# Patient Record
Sex: MALE | URBAN - METROPOLITAN AREA
[De-identification: names, ages, dates, MRNs, and addresses within clinical notes are randomized per-mention and may not be internally consistent; named-entity substitution may affect disease eponyms.]

---

## 2017-01-03 ENCOUNTER — OFFICE VISIT (OUTPATIENT)
Dept: FAMILY MEDICINE | Facility: CLINIC | Age: 62
End: 2017-01-03
Payer: COMMERCIAL

## 2017-01-03 VITALS
TEMPERATURE: 95.8 F | DIASTOLIC BLOOD PRESSURE: 76 MMHG | HEART RATE: 63 BPM | SYSTOLIC BLOOD PRESSURE: 134 MMHG | WEIGHT: 255.2 LBS | OXYGEN SATURATION: 95 % | HEIGHT: 70 IN | BODY MASS INDEX: 36.54 KG/M2

## 2017-01-03 DIAGNOSIS — J30.2 SEASONAL ALLERGIC RHINITIS, UNSPECIFIED ALLERGIC RHINITIS TRIGGER: Primary | ICD-10-CM

## 2017-01-03 DIAGNOSIS — Z71.89 ADVANCED DIRECTIVES, COUNSELING/DISCUSSION: ICD-10-CM

## 2017-01-03 DIAGNOSIS — K21.00 GASTROESOPHAGEAL REFLUX DISEASE WITH ESOPHAGITIS: ICD-10-CM

## 2017-01-03 PROCEDURE — 99214 OFFICE O/P EST MOD 30 MIN: CPT | Performed by: NURSE PRACTITIONER

## 2017-01-03 RX ORDER — CETIRIZINE HYDROCHLORIDE 10 MG/1
10 TABLET ORAL EVERY EVENING
Qty: 90 TABLET | Refills: 1 | Status: SHIPPED | OUTPATIENT
Start: 2017-01-03 | End: 2017-04-11

## 2017-01-03 NOTE — MR AVS SNAPSHOT
After Visit Summary   1/3/2017    Russell Miller    MRN: [id number]           Patient Information     Date Of Birth          1955        Visit Information        Provider Department      1/3/2017 10:00 AM Francine Maddox NP [facility]        Today's Diagnoses     Advanced directives, counseling/discussion    -  1     Seasonal allergic rhinitis, unspecified allergic rhinitis trigger         Gastroesophageal reflux disease with esophagitis           Care Instructions    Please take these medications daily, they need to be taken daily for full effectiveness. Let me know if they are helping or not. There are other options as we discussed. Follow up if your symptoms persist or worsen.          Allergic Rhinitis  Allergic rhinitis is an allergic reaction that affects the nose, and often the eyes. It s often known as nasal allergies. Nasal allergies are often due to things in the environment that are breathed in. Depending what you are sensitive to, nasal allergies may occur only during certain seasons. Or they may occur year round. Common indoor allergens include house dust mites, mold, cockroaches, and pet dander. Outdoor allergens include pollen from trees, grasses, and weeds.   Symptoms include a drippy, stuffy, and itchy nose. They also include sneezing and red and itchy eyes. You may feel tired more often. Severe allergies may also affect your breathing and trigger a condition called asthma.   Tests can be done to see what allergens are affecting you. You may be referred to an allergy specialist for testing and further evaluation.  Home care  The healthcare provider may prescribe medicines to help relieve allergy symptoms.   Ask the provider for advice on how to avoid substances that you are allergic to. Below are a few tips for each type of allergen.  Pet dander:    Do not have pets with fur and feathers.    If you cannot avoid having a pet, keep it out of your bedroom and off upholstered  furniture.  Pollen:    When pollen counts are high, keep windows of your car and home closed. If possible, use an air conditioner instead.    Wear a filter mask when mowing or doing yard work.  House dust mites:    Wash bedding every week in warm water and detergent and dry on a hot setting.    Cover the mattress, box spring, and pillows with allergy covers.     If possible, sleep in a room with no carpet, curtains, or upholstered furniture.  Cockroaches:    Store food in sealed containers.    Remove garbage from the home promptly.    Fix water leaks  Mold:    Keep humidity low by using a dehumidifier or air conditioner. Keep the dehumidifier and air conditioner clean and free of mold.    Clean moldy areas with bleach and water.  In general:    Vacuum once or twice a week. If possible, use a vacuum with a high-efficiency particulate air (HEPA) filter.    Do not smoke. Avoid cigarette smoke. Cigarette smoke is an irritant that can make symptoms worse.  Follow-up care  Follow up as advised by the health care provider or our staff. If you were referred to an allergy specialist, make this appointment promptly.  When to seek medical advice  Call your healthcare provider right away if the following occur:    Coughing or wheezing    Fever greater than 100.4 F (38 C)    Continuing symptoms, new symptoms, or worsening symptoms  Call 911 right away if you have:    Trouble breathing    Hives (raised red bumps)    Severe swelling of the face or severe itching of the eyes or mouth    1139-6487 [employer]. [address] [place] [address], [place] [address]. All rights reserved. This information is not intended as a substitute for professional medical care. Always follow your healthcare professional's instructions.        Lifestyle Changes for Controlling GERD  When you have GERD, stomach acid feels as if it s backing up toward your mouth. Whether or not you take medication to control your GERD, your symptoms can often be  improved with lifestyle changes. Talk to your doctor about the following suggestions, which may help you get relief from your symptoms.  Raise Your Head    Reflux is more likely to strike when you re lying down flat, because stomach fluid can flow backward more easily. Raising the head of your bed 4 to 6 inches can help. To do this:    Slide blocks or books under the legs at the head of your bed. Or, place a wedge under the mattress. Many foam stores can make a suitable wedge for you. The wedge should run from your waist to the top of your head.    Don t just prop your head on several pillows. This increases pressure on your stomach. It can make GERD worse.  Watch Your Eating Habits  Certain foods may increase the acid in your stomach or relax the lower esophageal sphincter, making GERD more likely. It s best to avoid the following:    Coffee, tea, and carbonated drinks (with and without caffeine)    Fatty, fried, or spicy food    Mint, chocolate, onions, and tomatoes    Any other foods that seem to irritate your stomach or cause you pain  Relieve the Pressure    Eat smaller meals, even if you have to eat more often.    Don t lie down right after you eat. Wait a few hours for your stomach to empty.    Avoid tight belts and tight-fitting clothes.    Lose excess weight.  Tobacco and Alcohol  Avoid smoking tobacco and drinking alcohol. They can make GERD symptoms worse.    7299-5340 [employer]. [address] [place] [address]. All rights reserved. This information is not intended as a substitute for professional medical care. Always follow your healthcare professional's instructions.              Follow-ups after your visit        Who to contact     Normal or non-critical lab and imaging results will be communicated to you by MyChart, letter or phone within 4 business days after the clinic has received the results. If you do not hear from us within 7 days, please contact the clinic through  "[employer] or phone. If you have a critical or abnormal lab result, we will notify you by phone as soon as possible.  Submit refill requests through [employer] or call your pharmacy and they will forward the refill request to us. Please allow 3 business days for your refill to be completed.          If you need to speak with a  for additional information , please call: [phone]             Additional Information About Your Visit        [employer] Information     [employer] lets you send messages to your doctor, view your test results, renew your prescriptions, schedule appointments and more. To sign up, go to [email or web address] . Click on \"Log in\" on the left side of the screen, which will take you to the Welcome page. Then click on \"Sign up Now\" on the right side of the page.     You will be asked to enter the access code listed below, as well as some personal information. Please follow the directions to create your username and password.     Your access code is: [email or web address]  Expires: 3/7/2017  1:00 PM     Your access code will  in 90 days. If you need help or a new code, please call your [facility] clinic or [phone].        Care EveryWhere ID     This is your Care EveryWhere ID. This could be used by other organizations to access your [facility] medical records  [id number]        Your Vitals Were     Pulse Temperature Height BMI (Body Mass Index) Pulse Oximetry       63 95.8  F (35.4  C) (Oral) 5' 10.28\" (1.785 m) 36.33 kg/m2 95%        Blood Pressure from Last 3 Encounters:   17 153/96   16 116/80   16 125/83    Weight from Last 3 Encounters:   17 255 lb 3.2 oz (115.758 kg)   16 256 lb (116.121 kg)   16 244 lb (110.678 kg)              Today, you had the following     No orders found for display         Today's Medication Changes          These changes are accurate as of: 1/3/17 10:13 AM.  If you have any questions, ask your nurse or doctor.             "   Start taking these medicines.        Dose/Directions    cetirizine 10 MG tablet   Commonly known as:  zyrTEC   Used for:  Seasonal allergic rhinitis, unspecified allergic rhinitis trigger   Started by:  Francine Maddox NP        Dose:  10 mg   Take 1 tablet (10 mg) by mouth every evening   Quantity:  90 tablet   Refills:  1         These medicines have changed or have updated prescriptions.        Dose/Directions    * omeprazole 20 MG CR capsule   Commonly known as:  priLOSEC   This may have changed:  Another medication with the same name was added. Make sure you understand how and when to take each.   Used for:  Gastroesophageal reflux disease without esophagitis   Changed by:  Sharad Maya PA-C        Dose:  20 mg   Take 1 capsule (20 mg) by mouth daily   Quantity:  90 capsule   Refills:  3       * omeprazole 20 MG CR capsule   Commonly known as:  priLOSEC   This may have changed:  You were already taking a medication with the same name, and this prescription was added. Make sure you understand how and when to take each.   Used for:  Gastroesophageal reflux disease with esophagitis   Changed by:  Francine Maddox NP        Dose:  20 mg   Take 1 capsule (20 mg) by mouth daily   Quantity:  90 capsule   Refills:  1       * Notice:  This list has 2 medication(s) that are the same as other medications prescribed for you. Read the directions carefully, and ask your doctor or other care provider to review them with you.      Stop taking these medicines if you haven't already. Please contact your care team if you have questions.     sildenafil 100 MG cap/tab   Commonly known as:  REVATIO/VIAGRA   Stopped by:  Francine Maddox NP           sildenafil 20 MG tablet   Commonly known as:  REVATIO/VIAGRA   Stopped by:  Francine Maddox NP                Where to get your medicines      These medications were sent to [facility] - [address] [facility]  [address] [facility] [place]  [address]     Phone:  [phone]    - cetirizine 10 MG tablet  - omeprazole 20 MG CR capsule             Primary Care Provider Office Phone # Fax #    Sharad Maya PA-C [phone]       [facility] [address]  [facility] [address]        Thank you!     Thank you for choosing [facility]  for your care. Our goal is always to provide you with excellent care. Hearing back from our patients is one way we can continue to improve our services. Please take a few minutes to complete the written survey that you may receive in the mail after your visit with us. Thank you!             Your Updated Medication List - Protect others around you: Learn how to safely use, store and throw away your medicines at www.disposemymeds.org.          This list is accurate as of: 1/3/17 10:13 AM.  Always use your most recent med list.                   Brand Name Dispense Instructions for use    albuterol 108 (90 BASE) MCG/ACT Inhaler    PROAIR HFA/PROVENTIL HFA/VENTOLIN HFA    1 Inhaler    Inhale 2 puffs into the lungs every 6 hours as needed for shortness of breath / dyspnea or wheezing       aspirin 81 MG tablet     30 tablet    Take 1 tablet (81 mg) by mouth daily       buPROPion 150 MG 24 hr tablet    WELLBUTRIN XL    60 tablet    Take 1 tab daily for 10 days, then increase to 2 tabs daily thereafter       carvedilol 12.5 MG tablet    COREG    180 tablet    Take 1 tablet (12.5 mg) by mouth 2 times daily (with meals) Taking once daily       cetirizine 10 MG tablet    zyrTEC    90 tablet    Take 1 tablet (10 mg) by mouth every evening       lisinopril 2.5 MG tablet    PRINIVIL/Zestril    30 tablet    Take 1 tablet (2.5 mg) by mouth daily       nitroglycerin 0.4 MG sublingual tablet    NITROSTAT    25 tablet    Place 1 tablet (0.4 mg) under the tongue every 5 minutes as needed for chest pain If still having symptoms after 3 doses call 911.       OLANZapine 5 MG tablet    zyPREXA    30 tablet    Take  1 tablet (5 mg) orally at bedtime       * omeprazole 20 MG CR capsule    priLOSEC    90 capsule    Take 1 capsule (20 mg) by mouth daily       * omeprazole 20 MG CR capsule    priLOSEC    90 capsule    Take 1 capsule (20 mg) by mouth daily       zolpidem 10 MG tablet    AMBIEN    30 tablet    Take 0.5-1 tablets (5-10 mg) by mouth nightly as needed for sleep       * Notice:  This list has 2 medication(s) that are the same as other medications prescribed for you. Read the directions carefully, and ask your doctor or other care provider to review them with you.

## 2017-01-03 NOTE — PATIENT INSTRUCTIONS
Please take these medications daily, they need to be taken daily for full effectiveness. Let me know if they are helping or not. There are other options as we discussed. Follow up if your symptoms persist or worsen.          Allergic Rhinitis  Allergic rhinitis is an allergic reaction that affects the nose, and often the eyes. It s often known as nasal allergies. Nasal allergies are often due to things in the environment that are breathed in. Depending what you are sensitive to, nasal allergies may occur only during certain seasons. Or they may occur year round. Common indoor allergens include house dust mites, mold, cockroaches, and pet dander. Outdoor allergens include pollen from trees, grasses, and weeds.   Symptoms include a drippy, stuffy, and itchy nose. They also include sneezing and red and itchy eyes. You may feel tired more often. Severe allergies may also affect your breathing and trigger a condition called asthma.   Tests can be done to see what allergens are affecting you. You may be referred to an allergy specialist for testing and further evaluation.  Home care  The healthcare provider may prescribe medicines to help relieve allergy symptoms.   Ask the provider for advice on how to avoid substances that you are allergic to. Below are a few tips for each type of allergen.  Pet dander:    Do not have pets with fur and feathers.    If you cannot avoid having a pet, keep it out of your bedroom and off upholstered furniture.  Pollen:    When pollen counts are high, keep windows of your car and home closed. If possible, use an air conditioner instead.    Wear a filter mask when mowing or doing yard work.  House dust mites:    Wash bedding every week in warm water and detergent and dry on a hot setting.    Cover the mattress, box spring, and pillows with allergy covers.     If possible, sleep in a room with no carpet, curtains, or upholstered furniture.  Cockroaches:    Store food in sealed  containers.    Remove garbage from the home promptly.    Fix water leaks  Mold:    Keep humidity low by using a dehumidifier or air conditioner. Keep the dehumidifier and air conditioner clean and free of mold.    Clean moldy areas with bleach and water.  In general:    Vacuum once or twice a week. If possible, use a vacuum with a high-efficiency particulate air (HEPA) filter.    Do not smoke. Avoid cigarette smoke. Cigarette smoke is an irritant that can make symptoms worse.  Follow-up care  Follow up as advised by the health care provider or our staff. If you were referred to an allergy specialist, make this appointment promptly.  When to seek medical advice  Call your healthcare provider right away if the following occur:    Coughing or wheezing    Fever greater than 100.4 F (38 C)    Continuing symptoms, new symptoms, or worsening symptoms  Call 911 right away if you have:    Trouble breathing    Hives (raised red bumps)    Severe swelling of the face or severe itching of the eyes or mouth    5140-1304 [employer]. [address] [place] [address]. All rights reserved. This information is not intended as a substitute for professional medical care. Always follow your healthcare professional's instructions.        Lifestyle Changes for Controlling GERD  When you have GERD, stomach acid feels as if it s backing up toward your mouth. Whether or not you take medication to control your GERD, your symptoms can often be improved with lifestyle changes. Talk to your doctor about the following suggestions, which may help you get relief from your symptoms.  Raise Your Head    Reflux is more likely to strike when you re lying down flat, because stomach fluid can flow backward more easily. Raising the head of your bed 4 to 6 inches can help. To do this:    Slide blocks or books under the legs at the head of your bed. Or, place a wedge under the mattress. Many [employer] stores can make a suitable wedge for  you. The wedge should run from your waist to the top of your head.    Don t just prop your head on several pillows. This increases pressure on your stomach. It can make GERD worse.  Watch Your Eating Habits  Certain foods may increase the acid in your stomach or relax the lower esophageal sphincter, making GERD more likely. It s best to avoid the following:    Coffee, tea, and carbonated drinks (with and without caffeine)    Fatty, fried, or spicy food    Mint, chocolate, onions, and tomatoes    Any other foods that seem to irritate your stomach or cause you pain  Relieve the Pressure    Eat smaller meals, even if you have to eat more often.    Don t lie down right after you eat. Wait a few hours for your stomach to empty.    Avoid tight belts and tight-fitting clothes.    Lose excess weight.  Tobacco and Alcohol  Avoid smoking tobacco and drinking alcohol. They can make GERD symptoms worse.    5194-0369 [employer]. [address] [place] [address], [place] [address]. All rights reserved. This information is not intended as a substitute for professional medical care. Always follow your healthcare professional's instructions.

## 2017-01-03 NOTE — PROGRESS NOTES
"  SUBJECTIVE:  Russell Miller is a 62 year old male who presents with the following concerns;              Symptoms: cc Present Absent Comment   Fever/Chills       Fatigue       Muscle Aches       Eye Irritation  x     Sneezing  x     Nasal Freddie/Drg  x     Sinus Pressure/Pain       Loss of smell  x     Dental pain       Sore Throat       Swollen Glands       Ear Pain/Fullness       Cough       Wheeze       Chest Pain       Shortness of breath       Rash       Other  x  allergies     Symptom duration:  ongoing for 35-40 years   Sympom severity:  mod   Treatments tried:  tylenol severe allergy and cold   Contacts:  none       Medications updated and reviewed.  Past, family and surgical history is updated and reviewed in the record.  Patient Active Problem List    Diagnosis Date Noted     Advanced directives, counseling/discussion 01/03/2017     Priority: Medium     Advance Care Planning 1/3/2017: ACP Review of Chart / Resources Provided:  Reviewed chart for advance care plan.  Russell Miller has been provided information and resources to begin or update their advance care plan.  Added by Jacquelyn Sy            MARGOT (generalized anxiety disorder) 09/01/2016     Priority: Medium     Moderate single current episode of major depressive disorder (H) 09/01/2016     Priority: Medium     Essential hypertension 04/22/2016     Priority: Medium     Psychophysiological insomnia 04/22/2016     Priority: Medium     Other male erectile dysfunction 04/22/2016     Priority: Medium     Gastroesophageal reflux disease without esophagitis 01/07/2016     Priority: Medium     Past Medical History   Diagnosis Date     MI (myocardial infarction) (H)      PUD (peptic ulcer disease)       Family History   Problem Relation Age of Onset     Heart Failure Mother      Hypertension Mother      CEREBROVASCULAR DISEASE Mother      \"numerous\"     Heart Failure Father      Hypertension Father      Myocardial Infarction Father 53     CABG x 2     DIABETES No " family hx of      Hyperlipidemia No family hx of      Other Cancer No family hx of      Prostate Cancer No family hx of      Colon Cancer No family hx of      ROS:  GENERAL: No change in weight, sleep or appetite.  Normal energy.  No fever or chills  EYES: Negative for vision changes or eye problems  ENT: No problems with ears, nose or throat.  No difficulty swallowing. Ongoing daily clear runny nose, itchy eyes, sneezing  RESP: No coughing, wheezing or shortness of breath  CV: No chest pains or palpitations  GI: No nausea, vomiting, abdominal pain, diarrhea, constipation or change in bowel habits POSITIVE for heart burn with sore throat and choking sensation at times.   : No urinary frequency or dysuria, bladder or kidney problems  MUSCULOSKELETAL: No significant muscle or joint pains  NEUROLOGIC: No headaches, numbness, tingling, weakness, problems with balance or coordination  PSYCHIATRIC: No problems with anxiety, depression or mental health  HEME/IMMUNE/ALLERGY: No history of bleeding or clotting problems or anemia.  No allergies or immune system problems  ENDOCRINE: No history of thyroid disease, diabetes or other endocrine disorders  SKIN: No rashes,worrisome lesions or skin problems    OBJECTIVE:  GENERAL:  Alert, no acute distress  EYES:  PERRL, EOM normal, conjunctiva and lids normal  HEENT:  Ears and TMs normal, oral mucosa and posterior oropharynx normal POSITIVE for nasal mucosa pale, bluish, clear nasal drainage, PND  RESP:  Lungs clear to auscultation.  CV:  Normal rate, regular rhythm, no murmur or gallop.  LYMPHATICS:  No cervical, supraclavicular adenopathy  NEURO:  Alert, oriented, speech and mentation normal    Assessment/Plan:     ICD-10-CM    1. Seasonal allergic rhinitis, unspecified allergic rhinitis trigger J30.2 cetirizine (ZYRTEC) 10 MG tablet   2. Advanced directives, counseling/discussion Z71.89    3. Gastroesophageal reflux disease with esophagitis K21.0 omeprazole (PRILOSEC) 20 MG CR  capsule        See patient instructions, discussed that cold medication can raise blood pressure. Please take these medications daily, they need to be taken daily for full effectiveness. Let me know if they are helping or not. There are other options as we discussed. Follow up if your symptoms persist or worsen.     NILSON Browne, FNP-BC

## 2017-01-03 NOTE — NURSING NOTE
"Chief Complaint   Patient presents with     Ent Problem       Initial /96 mmHg  Pulse 63  Temp(Src) 95.8  F (35.4  C) (Oral)  Ht 5' 10.28\" (1.785 m)  Wt 255 lb 3.2 oz (115.758 kg)  BMI 36.33 kg/m2  SpO2 95% Estimated body mass index is 36.33 kg/(m^2) as calculated from the following:    Height as of this encounter: 5' 10.28\" (1.785 m).    Weight as of this encounter: 255 lb 3.2 oz (115.758 kg)..  BP completed using cuff size: nadeen Sy MA   "

## 2017-02-28 DIAGNOSIS — F32.1 MODERATE SINGLE CURRENT EPISODE OF MAJOR DEPRESSIVE DISORDER (H): ICD-10-CM

## 2017-02-28 DIAGNOSIS — F51.04 PSYCHOPHYSIOLOGICAL INSOMNIA: ICD-10-CM

## 2017-02-28 NOTE — TELEPHONE ENCOUNTER
bupropion       Last Written Prescription Date: 01/23/17  Last Fill Quantity: 60; # refills: 1  Last Office Visit with [facility], P or [facility] prescribing provider:  01/03/17        Last PHQ-9 score on record=   PHQ-9 SCORE 9/26/2016   Total Score 18       Lab Results   Component Value Date    AST 33 09/01/2016     Lab Results   Component Value Date    ALT 36 09/01/2016         Zolpidem      Last Written Prescription Date:  01/23/17  Last Fill Quantity: 30,   # refills: 0  Last Office Visit with [facility], P or [facility] prescribing provider: 01/03/17  Future Office visit:       Routing refill request to provider for review/approval because:  Drug not on the [facility], P or [facility] refill protocol or controlled substance

## 2017-03-01 RX ORDER — BUPROPION HYDROCHLORIDE 150 MG/1
TABLET ORAL
Qty: 60 TABLET | Refills: 0 | Status: SHIPPED | OUTPATIENT
Start: 2017-03-01 | End: 2017-04-04

## 2017-03-01 RX ORDER — ZOLPIDEM TARTRATE 10 MG/1
5-10 TABLET ORAL
Qty: 30 TABLET | Refills: 0 | Status: SHIPPED | OUTPATIENT
Start: 2017-03-01 | End: 2017-03-27

## 2017-03-01 NOTE — TELEPHONE ENCOUNTER
LVM for patient to return call to clinic. Patient is due for medication follow up. Please schedule.       Roxie WangCMA

## 2017-03-27 DIAGNOSIS — F51.04 PSYCHOPHYSIOLOGICAL INSOMNIA: ICD-10-CM

## 2017-03-27 DIAGNOSIS — R45.86 VARIABLE MOOD: ICD-10-CM

## 2017-03-27 RX ORDER — OLANZAPINE 5 MG/1
TABLET ORAL
Qty: 30 TABLET | Refills: 0 | Status: SHIPPED | OUTPATIENT
Start: 2017-03-27 | End: 2017-04-04

## 2017-03-27 RX ORDER — ZOLPIDEM TARTRATE 10 MG/1
5-10 TABLET ORAL
Qty: 30 TABLET | Refills: 0 | Status: SHIPPED | OUTPATIENT
Start: 2017-03-27 | End: 2017-04-04

## 2017-03-27 NOTE — TELEPHONE ENCOUNTER
ZOLPIDEM      Last Written Prescription Date:  3-1-17  Last Fill Quantity: 30,   # refills: 0  [place] [facility] [employer] [email or web address] prescribing provider: 1-3-17  Future Office visit:       Routing refill request to provider for review/approval because:  Drug not on the [facility] or [employer] [facility] refill protocol or controlled substance    OLANZAPINE     Last Written Prescription Date: 2-27-17  Last Fill Quantity: 30, # refills: 1  Last [place] [facility] or [email or web address] prescribing provider: 1-3-17       BP Readings from Last 3 Encounters:   01/03/17 134/76   12/07/16 116/80   09/22/16 125/83     Pulse Readings from Last 2 Encounters:   01/03/17 63   12/07/16 74     Lab Results   Component Value Date     09/01/2016     Lab Results   Component Value Date    WBC 7.1 02/18/2016     Lab Results   Component Value Date    RBC 5.00 02/18/2016     Lab Results   Component Value Date    HGB 16.0 02/18/2016     Lab Results   Component Value Date    HCT 46.9 02/18/2016     No components found for: MCT  Lab Results   Component Value Date    MCV 94 02/18/2016     Lab Results   Component Value Date    MCH 32.0 02/18/2016     Lab Results   Component Value Date    MCHC 34.1 02/18/2016     Lab Results   Component Value Date    RDW 13.0 02/18/2016     Lab Results   Component Value Date     02/18/2016     No results found for: CHOL  No results found for: HDL  No results found for: LDL  No results found for: TRIG  No results found for: CHOLHDLRATIO

## 2017-03-28 NOTE — TELEPHONE ENCOUNTER
Hard copy of script faxed to [facility] pharmacy. Left message on voicemail for patient to return call

## 2017-04-04 ENCOUNTER — OFFICE VISIT (OUTPATIENT)
Dept: FAMILY MEDICINE | Facility: CLINIC | Age: 62
End: 2017-04-04
Payer: COMMERCIAL

## 2017-04-04 VITALS
SYSTOLIC BLOOD PRESSURE: 156 MMHG | BODY MASS INDEX: 36.36 KG/M2 | HEIGHT: 70 IN | WEIGHT: 254 LBS | HEART RATE: 74 BPM | DIASTOLIC BLOOD PRESSURE: 98 MMHG

## 2017-04-04 DIAGNOSIS — I10 ESSENTIAL HYPERTENSION: ICD-10-CM

## 2017-04-04 DIAGNOSIS — E66.01 MORBID OBESITY, UNSPECIFIED OBESITY TYPE (H): ICD-10-CM

## 2017-04-04 DIAGNOSIS — F41.1 GAD (GENERALIZED ANXIETY DISORDER): Primary | ICD-10-CM

## 2017-04-04 DIAGNOSIS — R45.86 VARIABLE MOOD: ICD-10-CM

## 2017-04-04 DIAGNOSIS — F32.1 MODERATE SINGLE CURRENT EPISODE OF MAJOR DEPRESSIVE DISORDER (H): ICD-10-CM

## 2017-04-04 DIAGNOSIS — I42.8 NONISCHEMIC CARDIOMYOPATHY (H): ICD-10-CM

## 2017-04-04 DIAGNOSIS — F51.04 PSYCHOPHYSIOLOGICAL INSOMNIA: ICD-10-CM

## 2017-04-04 PROCEDURE — 36415 COLL VENOUS BLD VENIPUNCTURE: CPT | Performed by: PHYSICIAN ASSISTANT

## 2017-04-04 PROCEDURE — 99214 OFFICE O/P EST MOD 30 MIN: CPT | Performed by: PHYSICIAN ASSISTANT

## 2017-04-04 PROCEDURE — 80048 BASIC METABOLIC PNL TOTAL CA: CPT | Performed by: PHYSICIAN ASSISTANT

## 2017-04-04 RX ORDER — LISINOPRIL/HYDROCHLOROTHIAZIDE 10-12.5 MG
1 TABLET ORAL DAILY
Qty: 30 TABLET | Refills: 1 | Status: SHIPPED | OUTPATIENT
Start: 2017-04-04 | End: 2017-06-05

## 2017-04-04 RX ORDER — LISINOPRIL 2.5 MG/1
2.5 TABLET ORAL DAILY
Qty: 90 TABLET | Refills: 1 | Status: CANCELLED | OUTPATIENT
Start: 2017-04-04

## 2017-04-04 RX ORDER — ZOLPIDEM TARTRATE 10 MG/1
5-10 TABLET ORAL
Qty: 30 TABLET | Refills: 0 | Status: CANCELLED | OUTPATIENT
Start: 2017-04-04

## 2017-04-04 RX ORDER — OLANZAPINE 10 MG/1
10 TABLET ORAL AT BEDTIME
Qty: 90 TABLET | Refills: 1 | Status: SHIPPED | OUTPATIENT
Start: 2017-04-04 | End: 2017-04-11

## 2017-04-04 RX ORDER — BUPROPION HYDROCHLORIDE 300 MG/1
300 TABLET ORAL EVERY MORNING
Qty: 90 TABLET | Refills: 1 | Status: SHIPPED | OUTPATIENT
Start: 2017-04-04 | End: 2017-06-13

## 2017-04-04 RX ORDER — CARVEDILOL 12.5 MG/1
12.5 TABLET ORAL 2 TIMES DAILY WITH MEALS
Qty: 180 TABLET | Refills: 1 | Status: SHIPPED | OUTPATIENT
Start: 2017-04-04 | End: 2017-06-05

## 2017-04-04 NOTE — MR AVS SNAPSHOT
After Visit Summary   4/4/2017    Russell Miller    MRN: [id number]           Patient Information     Date Of Birth          1955        Visit Information        Provider Department      4/4/2017 4:20 PM Sharad Maya PA-C [facility]        Today's Diagnoses     MARGOT (generalized anxiety disorder)    -  1    Psychophysiological insomnia        Moderate single current episode of major depressive disorder (H)        Nonischemic cardiomyopathy (H)        Essential hypertension        Variable mood (H)           Follow-ups after your visit        Additional Services     MENTAL HEALTH REFERRAL       Your provider has referred you to: [facility]: [facility] - [facility]  [phone]   [email or web address]   *Referral from [facility] Primary Care Provider required - Consultation Model - medication management & future refills will be returned to [facility] PCP upon completion of evaluation  *Please call to schedule an appointment.    All scheduling is subject to the client's specific insurance plan & benefits, provider/location availability, and provider clinical specialities.  Please arrive 15 minutes early for your first appointment and bring your completed paperwork.    Please be aware that coverage of these services is subject to the terms and limitations of your health insurance plan.  Call member services at your health plan with any benefit or coverage questions.                  Who to contact     Normal or non-critical lab and imaging results will be communicated to you by MyChart, letter or phone within 4 business days after the clinic has received the results. If you do not hear from us within 7 days, please contact the clinic through MyChart or phone. If you have a critical or abnormal lab result, we will notify you by phone as soon as possible.  Submit refill requests through MyChart or call your  "pharmacy and they will forward the refill request to us. Please allow 3 business days for your refill to be completed.          If you need to speak with a  for additional information , please call: [phone]             Additional Information About Your Visit        [employer] Information     [employer] lets you send messages to your doctor, view your test results, renew your prescriptions, schedule appointments and more. To sign up, go to [email or web address] . Click on \"Log in\" on the left side of the screen, which will take you to the Welcome page. Then click on \"Sign up Now\" on the right side of the page.     You will be asked to enter the access code listed below, as well as some personal information. Please follow the directions to create your username and password.     Your access code is: [email or web address]  Expires: 7/3/2017  5:00 PM     Your access code will  in 90 days. If you need help or a new code, please call your [place] clinic or [phone].        Care EveryWhere ID     This is your Care EveryWhere ID. This could be used by other organizations to access your [place] medical records  [id number]        Your Vitals Were     Pulse Height BMI (Body Mass Index)             74 5' 10\" (1.778 m) 36.45 kg/m2          Blood Pressure from Last 3 Encounters:   17 (!) 156/98   17 134/76   16 116/80    Weight from Last 3 Encounters:   17 254 lb (115.2 kg)   17 255 lb 3.2 oz (115.8 kg)   16 256 lb (116.1 kg)              We Performed the Following     Basic metabolic panel  (Ca, Cl, CO2, Creat, Gluc, K, Na, BUN)     MENTAL HEALTH REFERRAL          Today's Medication Changes          These changes are accurate as of: 17  5:01 PM.  If you have any questions, ask your nurse or doctor.               Start taking these medicines.        Dose/Directions    lisinopril-hydrochlorothiazide 10-12.5 MG per tablet   Commonly known as:  PRINZIDE/ZESTORETIC   Used " for:  Essential hypertension   Started by:  Sharad Maya PA-C        Dose:  1 tablet   Take 1 tablet by mouth daily   Quantity:  30 tablet   Refills:  1         These medicines have changed or have updated prescriptions.        Dose/Directions    buPROPion 300 MG 24 hr tablet   Commonly known as:  WELLBUTRIN XL   This may have changed:    - medication strength  - how much to take  - how to take this  - when to take this  - additional instructions   Used for:  Moderate single current episode of major depressive disorder (H)   Changed by:  Sharad Maya PA-C        Dose:  300 mg   Take 1 tablet (300 mg) by mouth every morning   Quantity:  90 tablet   Refills:  1       OLANZapine 10 MG tablet   Commonly known as:  zyPREXA   This may have changed:    - medication strength  - how much to take  - how to take this  - when to take this  - additional instructions   Used for:  Variable mood (H), Psychophysiological insomnia   Changed by:  Sharad Maya PA-C        Dose:  10 mg   Take 1 tablet (10 mg) by mouth At Bedtime   Quantity:  90 tablet   Refills:  1       omeprazole 20 MG CR capsule   Commonly known as:  priLOSEC   This may have changed:  Another medication with the same name was removed. Continue taking this medication, and follow the directions you see here.   Used for:  Gastroesophageal reflux disease with esophagitis   Changed by:  Francine Maddox NP        Dose:  20 mg   Take 1 capsule (20 mg) by mouth daily   Quantity:  90 capsule   Refills:  1         Stop taking these medicines if you haven't already. Please contact your care team if you have questions.     albuterol 108 (90 BASE) MCG/ACT Inhaler   Commonly known as:  PROAIR HFA/PROVENTIL HFA/VENTOLIN HFA   Stopped by:  Sharad Maya PA-C           lisinopril 2.5 MG tablet   Commonly known as:  PRINIVIL/Zestril   Stopped by:  Sharad Maya PA-C           zolpidem 10 MG tablet   Commonly known as:  AMBIEN   Stopped by:  Sharad Maya  TOM Velazquez                Where to get your medicines      These medications were sent to [facility] [place]  [place] - [address]  [address] [address] [place] [address]     Phone:  [phone]     buPROPion 300 MG 24 hr tablet    carvedilol 12.5 MG tablet    lisinopril-hydrochlorothiazide 10-12.5 MG per tablet    OLANZapine 10 MG tablet                Primary Care Provider Office Phone # Fax #    Sharad Maya PA-C [phone]       [facility] [address]  [place] [address]        Thank you!     Thank you for choosing [facility]  for your care. Our goal is always to provide you with excellent care. Hearing back from our patients is one way we can continue to improve our services. Please take a few minutes to complete the written survey that you may receive in the mail after your visit with us. Thank you!             Your Updated Medication List - Protect others around you: Learn how to safely use, store and throw away your medicines at www.disposemymeds.org.          This list is accurate as of: 4/4/17  5:01 PM.  Always use your most recent med list.                   Brand Name Dispense Instructions for use    aspirin 81 MG tablet     30 tablet    Take 1 tablet (81 mg) by mouth daily       buPROPion 300 MG 24 hr tablet    WELLBUTRIN XL    90 tablet    Take 1 tablet (300 mg) by mouth every morning       carvedilol 12.5 MG tablet    COREG    180 tablet    Take 1 tablet (12.5 mg) by mouth 2 times daily (with meals) Taking once daily       cetirizine 10 MG tablet    zyrTEC    90 tablet    Take 1 tablet (10 mg) by mouth every evening       lisinopril-hydrochlorothiazide 10-12.5 MG per tablet    PRINZIDE/ZESTORETIC    30 tablet    Take 1 tablet by mouth daily       nitroglycerin 0.4 MG sublingual tablet    NITROSTAT    25 tablet    Place 1 tablet (0.4 mg) under the tongue every 5 minutes as needed for chest pain If still having symptoms after 3  doses call 911.       OLANZapine 10 MG tablet    zyPREXA    90 tablet    Take 1 tablet (10 mg) by mouth At Bedtime       omeprazole 20 MG CR capsule    priLOSEC    90 capsule    Take 1 capsule (20 mg) by mouth daily

## 2017-04-04 NOTE — LETTER
[facility] [place]  [address]  [place] [address]  [phone]        April 17, 2017      Russell Miller  [address] [place], [place] [address]  [place] [address]        Dear Russell,      Your recent kidney function test came back normal.    Results for orders placed or performed in visit on 04/04/17   Basic metabolic panel  (Ca, Cl, CO2, Creat, Gluc, K, Na, BUN)   Result Value Ref Range    Sodium 141 133 - 144 mmol/L    Potassium 3.9 3.4 - 5.3 mmol/L    Chloride 107 94 - 109 mmol/L    Carbon Dioxide 27 20 - 32 mmol/L    Anion Gap 7 3 - 14 mmol/L    Glucose 94 70 - 99 mg/dL    Urea Nitrogen 14 7 - 30 mg/dL    Creatinine 0.94 0.66 - 1.25 mg/dL    GFR Estimate 81 >60 mL/min/1.7m2    GFR Estimate If Black >90   GFR Calc   >60 mL/min/1.7m2    Calcium 9.1 8.5 - 10.1 mg/dL       If you have any questions or concerns, please call myself or my nurse at [phone].    Sincerely,    Sharad Maya PA-C/juliana

## 2017-04-04 NOTE — PROGRESS NOTES
SUBJECTIVE:                                                    Russell Miller is a 62 year old male who presents to clinic today for the following health issues:      Hypertension Follow-up      Outpatient blood pressures are not being checked.    Low Salt Diet: not monitoring salt       Depression and Anxiety Follow-Up    Status since last visit: No change    Other associated symptoms:None    Complicating factors:     Significant life event: No     Current substance abuse: None    PHQ-9 SCORE 9/26/2016   Total Score 18     MARGOT-7 SCORE 9/26/2016   Total Score 21        PHQ-9  English      PHQ-9   Any Language     GAD7       Amount of exercise or physical activity: None    Problems taking medications regularly: No    Medication side effects: none    Diet: regular (no restrictions)      Patient reports that his mood is good.   ambien not helping him sleep. Ruminating thoughts .   No manic episodes.     Problem list and histories reviewed & adjusted, as indicated.  Additional history: as documented        Reviewed and updated as needed this visit by clinical staff  Tobacco  Allergies  Meds  Problems  Med Hx  Surg Hx  Fam Hx  Soc Hx        Reviewed and updated as needed this visit by Provider  Tobacco  Allergies  Meds  Problems  Med Hx  Surg Hx  Fam Hx  Soc Hx          All other systems negative except as outline above  OBJECTIVE:  Eye exam - right eye normal lid, conjunctiva, cornea, pupil and fundus, left eye normal lid, conjunctiva, cornea, pupil and fundus.  Thyroid not palpable, not enlarged, no nodules detected.  CHEST:chest clear to IPPA, no tachypnea, retractions or cyanosis and S1, S2 normal, no murmur, no gallop, rate regular.  His disks are flat. Pupils equal, round, reactive to light. Extraocular movements full. Visual fields full. Face moves symmetrically. Tongue midline. Hearing mildly decreased to finger-rubbing at approximately 6-8 inches. Neck without bruits. CV: S1, S2. Motor strength 5/5.  Reflexes were 2/4. Toe signs were downgoing. Normal position sense. Good finger-nose-finger and fine finger movement. Gait: he arianne from a chair without difficulty and has a mildly broad-based gait.  Russell was seen today for hypertension and depression.    Diagnoses and all orders for this visit:    MARGOT (generalized anxiety disorder)    Psychophysiological insomnia  -     OLANZapine (ZYPREXA) 10 MG tablet; Take 1 tablet (10 mg) by mouth At Bedtime    Moderate single current episode of major depressive disorder (H)  -     buPROPion (WELLBUTRIN XL) 300 MG 24 hr tablet; Take 1 tablet (300 mg) by mouth every morning  -     MENTAL HEALTH REFERRAL    Nonischemic cardiomyopathy (H)    Essential hypertension  -     carvedilol (COREG) 12.5 MG tablet; Take 1 tablet (12.5 mg) by mouth 2 times daily (with meals) Taking once daily  -     Basic metabolic panel  (Ca, Cl, CO2, Creat, Gluc, K, Na, BUN)  -     lisinopril-hydrochlorothiazide (PRINZIDE/ZESTORETIC) 10-12.5 MG per tablet; Take 1 tablet by mouth daily    Variable mood (H)  -     OLANZapine (ZYPREXA) 10 MG tablet; Take 1 tablet (10 mg) by mouth At Bedtime    Morbid obesity, unspecified obesity type (H)    Other orders  -     Cancel: zolpidem (AMBIEN) 10 MG tablet; Take 0.5-1 tablets (5-10 mg) by mouth nightly as needed for sleep  -     Cancel: lisinopril (PRINIVIL/ZESTRIL) 2.5 MG tablet; Take 1 tablet (2.5 mg) by mouth daily      work on lifestyle modification  Recheck in 2-4 wks

## 2017-04-05 LAB
ANION GAP SERPL CALCULATED.3IONS-SCNC: 7 MMOL/L (ref 3–14)
BUN SERPL-MCNC: 14 MG/DL (ref 7–30)
CALCIUM SERPL-MCNC: 9.1 MG/DL (ref 8.5–10.1)
CHLORIDE SERPL-SCNC: 107 MMOL/L (ref 94–109)
CO2 SERPL-SCNC: 27 MMOL/L (ref 20–32)
CREAT SERPL-MCNC: 0.94 MG/DL (ref 0.66–1.25)
GFR SERPL CREATININE-BSD FRML MDRD: 81 ML/MIN/1.7M2
GLUCOSE SERPL-MCNC: 94 MG/DL (ref 70–99)
POTASSIUM SERPL-SCNC: 3.9 MMOL/L (ref 3.4–5.3)
SODIUM SERPL-SCNC: 141 MMOL/L (ref 133–144)

## 2017-04-11 ENCOUNTER — OFFICE VISIT (OUTPATIENT)
Dept: PSYCHIATRY | Facility: CLINIC | Age: 62
End: 2017-04-11
Attending: PHYSICIAN ASSISTANT
Payer: COMMERCIAL

## 2017-04-11 VITALS
TEMPERATURE: 97.6 F | WEIGHT: 260 LBS | HEART RATE: 68 BPM | BODY MASS INDEX: 37.31 KG/M2 | SYSTOLIC BLOOD PRESSURE: 120 MMHG | DIASTOLIC BLOOD PRESSURE: 82 MMHG

## 2017-04-11 DIAGNOSIS — F41.1 GAD (GENERALIZED ANXIETY DISORDER): ICD-10-CM

## 2017-04-11 DIAGNOSIS — G47.10 EXCESSIVE SLEEPINESS: Primary | ICD-10-CM

## 2017-04-11 DIAGNOSIS — F33.1 MAJOR DEPRESSIVE DISORDER, RECURRENT EPISODE, MODERATE (H): ICD-10-CM

## 2017-04-11 PROCEDURE — 90792 PSYCH DIAG EVAL W/MED SRVCS: CPT | Performed by: CLINICAL NURSE SPECIALIST

## 2017-04-11 ASSESSMENT — ANXIETY QUESTIONNAIRES
2. NOT BEING ABLE TO STOP OR CONTROL WORRYING: MORE THAN HALF THE DAYS
7. FEELING AFRAID AS IF SOMETHING AWFUL MIGHT HAPPEN: NOT AT ALL
5. BEING SO RESTLESS THAT IT IS HARD TO SIT STILL: NEARLY EVERY DAY
1. FEELING NERVOUS, ANXIOUS, OR ON EDGE: MORE THAN HALF THE DAYS
6. BECOMING EASILY ANNOYED OR IRRITABLE: MORE THAN HALF THE DAYS
3. WORRYING TOO MUCH ABOUT DIFFERENT THINGS: SEVERAL DAYS
GAD7 TOTAL SCORE: 13

## 2017-04-11 ASSESSMENT — PATIENT HEALTH QUESTIONNAIRE - PHQ9: 5. POOR APPETITE OR OVEREATING: NEARLY EVERY DAY
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
## 2017-04-11 NOTE — PATIENT INSTRUCTIONS
Treatment Plan:  Discontinue olanzapine (Zyprexa) by reducing to 5 mg for 4-5 days, then discontinue.     Continue bupropion (Wellbutrin)  mg in the morning.     Complete sleep consult.     Sign release of information for [facility]     Follow up in one month.     - Recommend patient discuss medications with their pharmacist.   - Safety plan was reviewed; to the ER as needed or call after hours crisis line; [phone]  - Education and counseling was done regarding use of medications, psychotherapy options  - Call [phone] for appointment or to speak to a nurse.   -Office hours: Monday through Thursday 8:00 am to 4:30 pm; Friday 8:00 am to Noon.   - Patient was given a copy of this Treatment Plan today.

## 2017-04-11 NOTE — MR AVS SNAPSHOT
After Visit Summary   4/11/2017    Russell Miller    MRN: [id number]           Patient Information     Date Of Birth          1955        Visit Information        Provider Department      4/11/2017 9:45 AM Annelise Aguialr APRN [facility]        Today's Diagnoses     Excessive sleepiness    -  1      Care Instructions    Treatment Plan:  Discontinue olanzapine (Zyprexa) by reducing to 5 mg for 4-5 days, then discontinue.     Continue bupropion (Wellbutrin)  mg in the morning.     Complete sleep consult.     Sign release of information for [facility]     Follow up in one month.     - Recommend patient discuss medications with their pharmacist.   - Safety plan was reviewed; to the ER as needed or call after hours crisis line; [phone]  - Education and counseling was done regarding use of medications, psychotherapy options  - Call [phone] for appointment or to speak to a nurse.   -Office hours: Monday through Thursday 8:00 am to 4:30 pm; Friday 8:00 am to Noon.   - Patient was given a copy of this Treatment Plan today.         Follow-ups after your visit        Additional Services     SLEEP EVALUATION & MANAGEMENT REFERRAL - ADULT       Please be aware that coverage of these services is subject to the terms and limitations of your health insurance plan.  Call member services at your health plan with any benefit or coverage questions.      Please bring the following to your appointment:    >>   List of current medications   >>   This referral request   >>   Any documents/labs given to you for this referral    [facility] [phone] (Age 15 and up)                  Your next 10 appointments already scheduled     Apr 25, 2017  1:00 PM CDT   Office Visit with Sharad Maya PA-C   [facility] [place] ([facility] [place])    [address]  [place] [address] [phone]           Bring a current list  "of meds and any records pertaining to this visit.  For Physicals, please bring immunization records and any forms needing to be filled out.  Please arrive 10 minutes early to complete paperwork.              Future tests that were ordered for you today     Open Future Orders        Priority Expected Expires Ordered    SLEEP EVALUATION & MANAGEMENT REFERRAL - ADULT Routine  2018            Who to contact     If you have questions or need follow up information about today's clinic visit or your schedule please contact [facility] directly at [phone].  Normal or non-critical lab and imaging results will be communicated to you by Angelantonihart, letter or phone within 4 business days after the clinic has received the results. If you do not hear from us within 7 days, please contact the clinic through Mandaet or phone. If you have a critical or abnormal lab result, we will notify you by phone as soon as possible.  Submit refill requests through Mindscore or call your pharmacy and they will forward the refill request to us. Please allow 3 business days for your refill to be completed.          Additional Information About Your Visit        AngelantoniharCuriously Information     Mindscore lets you send messages to your doctor, view your test results, renew your prescriptions, schedule appointments and more. To sign up, go to [email or web address] . Click on \"Log in\" on the left side of the screen, which will take you to the Welcome page. Then click on \"Sign up Now\" on the right side of the page.     You will be asked to enter the access code listed below, as well as some personal information. Please follow the directions to create your username and password.     Your access code is: [email or web address]  Expires: 7/3/2017  5:00 PM     Your access code will  in 90 days. If you need help or a new code, please call your [facility] or [phone].        Care EveryWhere ID     This is your Care EveryWhere ID. This " could be used by other organizations to access your [facility] medical records  [id number]        Your Vitals Were     Pulse Temperature BMI (Body Mass Index)             68 97.6  F (36.4  C) (Tympanic) 37.31 kg/m2          Blood Pressure from Last 3 Encounters:   04/11/17 120/82   04/04/17 (!) 156/98   01/03/17 134/76    Weight from Last 3 Encounters:   04/11/17 260 lb (117.9 kg)   04/04/17 254 lb (115.2 kg)   01/03/17 255 lb 3.2 oz (115.8 kg)               Primary Care Provider Office Phone # Fax #    Sharad Maya PA-C [phone]       [facility] [address] [place] [address]        Thank you!     Thank you for choosing [facility]  for your care. Our goal is always to provide you with excellent care. Hearing back from our patients is one way we can continue to improve our services. Please take a few minutes to complete the written survey that you may receive in the mail after your visit with us. Thank you!             Your Updated Medication List - Protect others around you: Learn how to safely use, store and throw away your medicines at www.disposemymeds.org.          This list is accurate as of: 4/11/17 10:37 AM.  Always use your most recent med list.                   Brand Name Dispense Instructions for use    aspirin 81 MG tablet     30 tablet    Take 1 tablet (81 mg) by mouth daily       buPROPion 300 MG 24 hr tablet    WELLBUTRIN XL    90 tablet    Take 1 tablet (300 mg) by mouth every morning       carvedilol 12.5 MG tablet    COREG    180 tablet    Take 1 tablet (12.5 mg) by mouth 2 times daily (with meals) Taking once daily       lisinopril-hydrochlorothiazide 10-12.5 MG per tablet    PRINZIDE/ZESTORETIC    30 tablet    Take 1 tablet by mouth daily       nitroglycerin 0.4 MG sublingual tablet    NITROSTAT    25 tablet    Place 1 tablet (0.4 mg) under the tongue every 5 minutes as needed for chest pain If still having symptoms after 3 doses call 911.        omeprazole 20 MG CR capsule    priLOSEC    90 capsule    Take 1 capsule (20 mg) by mouth daily

## 2017-04-12 ASSESSMENT — PATIENT HEALTH QUESTIONNAIRE - PHQ9: SUM OF ALL RESPONSES TO PHQ QUESTIONS 1-9: 13

## 2017-04-12 ASSESSMENT — ANXIETY QUESTIONNAIRES: GAD7 TOTAL SCORE: 13

## 2017-04-20 PROBLEM — E66.01 MORBID OBESITY, UNSPECIFIED OBESITY TYPE (H): Chronic | Status: ACTIVE | Noted: 2017-04-04

## 2017-04-26 DIAGNOSIS — K21.00 GASTROESOPHAGEAL REFLUX DISEASE WITH ESOPHAGITIS: ICD-10-CM

## 2017-04-26 NOTE — TELEPHONE ENCOUNTER
Drug: Omeprazole 20mg  Last Fill Date: 1-2-17  Last Fill Quantity: 90  Last Office Visit: 4-4-17    Kamini Hilario  [facility] [place] [address]  Phone :[phone]  Fax: [phone]

## 2017-05-08 ENCOUNTER — OFFICE VISIT (OUTPATIENT)
Dept: SLEEP MEDICINE | Facility: CLINIC | Age: 62
End: 2017-05-08
Payer: COMMERCIAL

## 2017-05-08 VITALS
HEIGHT: 71 IN | WEIGHT: 253.4 LBS | SYSTOLIC BLOOD PRESSURE: 118 MMHG | HEART RATE: 65 BPM | DIASTOLIC BLOOD PRESSURE: 81 MMHG | OXYGEN SATURATION: 96 % | BODY MASS INDEX: 35.48 KG/M2

## 2017-05-08 DIAGNOSIS — I10 ESSENTIAL HYPERTENSION: ICD-10-CM

## 2017-05-08 DIAGNOSIS — R06.89 DYSPNEA AND RESPIRATORY ABNORMALITY: ICD-10-CM

## 2017-05-08 DIAGNOSIS — E66.09 NON MORBID OBESITY DUE TO EXCESS CALORIES: ICD-10-CM

## 2017-05-08 DIAGNOSIS — R53.83 MALAISE AND FATIGUE: ICD-10-CM

## 2017-05-08 DIAGNOSIS — G47.10 EXCESSIVE SLEEPINESS: ICD-10-CM

## 2017-05-08 DIAGNOSIS — G47.10 EXCESSIVE SLEEPINESS: Primary | ICD-10-CM

## 2017-05-08 DIAGNOSIS — G25.81 RESTLESS LEGS SYNDROME (RLS): ICD-10-CM

## 2017-05-08 DIAGNOSIS — R53.81 MALAISE AND FATIGUE: ICD-10-CM

## 2017-05-08 DIAGNOSIS — G47.00 INSOMNIA, UNSPECIFIED TYPE: ICD-10-CM

## 2017-05-08 DIAGNOSIS — R06.00 DYSPNEA AND RESPIRATORY ABNORMALITY: ICD-10-CM

## 2017-05-08 DIAGNOSIS — I48.0 PAROXYSMAL ATRIAL FIBRILLATION (H): Chronic | ICD-10-CM

## 2017-05-08 LAB — FERRITIN SERPL-MCNC: 173 NG/ML (ref 26–388)

## 2017-05-08 PROCEDURE — 36415 COLL VENOUS BLD VENIPUNCTURE: CPT | Performed by: PHYSICIAN ASSISTANT

## 2017-05-08 PROCEDURE — 99214 OFFICE O/P EST MOD 30 MIN: CPT | Performed by: PHYSICIAN ASSISTANT

## 2017-05-08 PROCEDURE — 82728 ASSAY OF FERRITIN: CPT | Performed by: PHYSICIAN ASSISTANT

## 2017-05-08 RX ORDER — CETIRIZINE HYDROCHLORIDE 10 MG/1
10 TABLET ORAL DAILY
COMMUNITY
End: 2017-07-17

## 2017-05-08 RX ORDER — ZOLPIDEM TARTRATE 5 MG/1
TABLET ORAL
Qty: 1 TABLET | Refills: 0 | Status: SHIPPED | OUTPATIENT
Start: 2017-05-08 | End: 2017-05-26

## 2017-05-08 NOTE — NURSING NOTE
"Chief Complaint   Patient presents with     Consult     doesn't sleep well, doesn't sleep through the night.  previous study, no treatment recommended 10 yrs ago.       Initial /81  Pulse 65  Ht 1.803 m (5' 11\")  Wt 114.9 kg (253 lb 6.4 oz)  SpO2 96%  BMI 35.34 kg/m2 Estimated body mass index is 35.34 kg/(m^2) as calculated from the following:    Height as of this encounter: 1.803 m (5' 11\").    Weight as of this encounter: 114.9 kg (253 lb 6.4 oz).  Medication Reconciliation: complete   Neck Cir (cm): 45 cm (5/8/2017  7:00 AM)  ESS 5    America Dickens CMA      "

## 2017-05-08 NOTE — MR AVS SNAPSHOT
After Visit Summary   5/8/2017    Russell Miller    MRN: [id number]           Patient Information     Date Of Birth          1955        Visit Information        Provider Department      5/8/2017 7:00 AM Orlando Nicole PA [facility]        Today's Diagnoses     Excessive sleepiness    -  1    Insomnia, unspecified type        Non morbid obesity due to excess calories        Restless legs syndrome (RLS)        Essential hypertension        Dyspnea and respiratory abnormality        Malaise and fatigue        Paroxysmal atrial fibrillation (H)          Care Instructions      Your BMI is Body mass index is 35.34 kg/(m^2).  Weight management is a personal decision.  If you are interested in exploring weight loss strategies, the following discussion covers the approaches that may be successful. Body mass index (BMI) is one way to tell whether you are at a healthy weight, overweight, or obese. It measures your weight in relation to your height.  A BMI of 18.5 to 24.9 is in the healthy range. A person with a BMI of 25 to 29.9 is considered overweight, and someone with a BMI of 30 or greater is considered obese. More than two-thirds of American adults are considered overweight or obese.  Being overweight or obese increases the risk for further weight gain. Excess weight may lead to heart disease and diabetes.  Creating and following plans for healthy eating and physical activity may help you improve your health.  Weight control is part of healthy lifestyle and includes exercise, emotional health, and healthy eating habits. Careful eating habits lifelong are the mainstay of weight control. Though there are significant health benefits from weight loss, long-term weight loss with diet alone may be very difficult to achieve- studies show long-term success with dietary management in less than 10% of people. Attaining a healthy weight may be especially difficult to achieve in those with severe  "obesity. In some cases, medications, devices and surgical management might be considered.  What can you do?  If you are overweight or obese and are interested in methods for weight loss, you should discuss this with your provider.     Consider reducing daily calorie intake by 500 calories.     Keep a food journal.     Avoiding skipping meals, consider cutting portions instead.    Diet combined with exercise helps maintain muscle while optimizing fat loss. Strength training is particularly important for building and maintaining muscle mass. Exercise helps reduce stress, increase energy, and improves fitness. Increasing exercise without diet control, however, may not burn enough calories to loose weight.       Start walking three days a week 10-20 minutes at a time    Work towards walking thirty minutes five days a week     Eventually, increase the speed of your walking for 1-2 minutes at time    In addition, we recommend that you review healthy lifestyles and methods for weight loss available through the National Institutes of Health patient information sites:  http://win.niddk.nih.gov/publications/index.htm    And look into health and wellness programs that may be available through your health insurance provider, employer, local community center, or antonette club.    Weight management plan: Patient was referred to their PCP to discuss a diet and exercise plan.    Provider : Orlando Nicole  Contact Information: [facility] [phone]    Rust Points:  1. What is Obstructive Sleep Apnea (NEHA)? NEHA is the most common type of sleep apnea. Apnea literally means, \"without breath.\" It is characterized by repetitive pauses in breathing, despite continued effort to breathe, and is usually associated with a reduction in blood oxygen saturation. Apneas can last 10 to over 60 seconds. It is caused by narrowing or collapse of the upper airway as muscles relax during sleep. A number of things can make apnea " worse, including: sleeping on your back, having alcohol in the evening, smoking, asthma, allergies, nasal congestion, and weight gain.  2. What are the consequences of NEHA? Symptoms include: daytime sleepiness- possibly increasing the risk of falling asleep while driving, unrefreshing/restless sleep, snoring, insomnia, waking frequently to urinate, waking with heartburn or reflux, reduced concentration and memory, and morning headaches. Other health consequences may include development of high blood pressure. Untreated NEHA also can contribute to heart disease, stroke and diabetes.   3. What are the treatment options? In most situations, sleep apnea is a lifelong disease that must be managed with daily therapy. Continuous Positive Airway (CPAP) is the most reliable treatment. A mouthguard to hold your jaw forward is usually the next most reliable option. Other options include postioning devices (to keep you off your back), nasal valves, tongue retaining device, weight loss, surgery. There is more detail about these options toward the end of this document.  4. What are the most important things to remember about using CPAP?     WHERE CAN I FIND MORE INFORMATION?    American Academy of Sleep Medicine Patient information on sleep disorders:  http://yoursleep.aasmnet.org    CPAP-  WHY AND HOW?                                 Continuous positive airway pressure, or CPAP, is the most effective treatment for obstructive sleep apnea. It works by using air pressure to hold your throat open. A decision to use CPAP is a major step forward in the pursuit of a healthier life. The successful use of CPAP will help you breathe easier, sleep better and live healthier. Using CPAP can be a positive experience if you keep these charles points in mind:  1. Commitment  CPAP is not a quick fix for your problem. It involves a long-term commitment to improve your sleep and your health.    2. Communication  Stay in close communication with both  "your sleep doctor and your CPAP supplier. Ask lots of questions and seek help when you need it.    3. Consistency  Use CPAP all night, every night and for every nap. You will receive the maximum health benefits from CPAP when you use it every time that you sleep. This will also make it easier for your body to adjust to the treatment.    4. Correction  The first machine and mask that you try may not be the best ones for you. Work with your sleep doctor and your CPAP supplier to make corrections to your equipment selection. Ask about trying a different type of machine or mask if you have ongoing problems. Make sure that your mask is a good fit and learn to use your equipment properly.    5. Challenge  Tell a family member or close friend to ask you each morning if you used your CPAP the previous night. Have someone to challenge you to give it your best effort.    6. Connection   Your adjustment to CPAP will be easier if you are able to connect with others who use the same treatment. Ask your sleep doctor if there is a support group in your area for people who have sleep apnea, or look for one on the Internet.  7. Comfort   Increase your level of comfort by using a saline spray, decongestant or heated humidifier if CPAP irritates your nose, mouth or throat. Use your unit's \"ramp\" setting to slowly get used to the air pressure level. There may be soft pads you can buy that will fit over your mask straps. Look on www.CPAP.com for accessories such as these straps, a pillow contoured for side-sleeping with CPAP, longer hoses, hose covers to reduce condensation, or stands to keep the hose out of your way.                   8. Cleaning   Clean your mask, tubing and headgear on a regular basis. Put this time in your schedule so that you don't forget to do it. Check and replace the filters for your CPAP unit and humidifier.    9. Completion   Although you are never finished with CPAP therapy, you should reward yourself by " celebrating the completion of your first month of treatment. Expect this first month to be your hardest period of adjustment. It will involve some trial and error as you find the machine, mask and pressure settings that are right for you.    10. Continuation  After your first month of treatment, continue to make a daily commitment to use your CPAP all night, every night and for every nap.    CPAP-Tips to starting with success:  Begin using your CPAP for short periods of time during the day while you watch TV or read. This eliminates the pressure of trying to fall asleep with it when it is still a new sensation.    Use CPAP every night and for every nap. Using it less often reduces the health benefits and makes it harder for your body to get used to it.    Newer CPAP models are virtually silent; however, if you find the sound of your CPAP machine to be bothersome, place the unit under your bed to dampen the sound.     Make small adjustments to your mask, tubing, straps and headgear until you get the right fit. Tightening the mask may actually worsen the leak.  If it leaves significant marks on your face or irritates the bridge of your nose, it may not be the best mask for you.  Speak with the person who supplied the mask and consider trying other masks. Insurances will allow you to try different masks during the first month of starting CPAP.  Insurance also covers a new mask, hose and filter about every 3-6 months.    Use a saline nasal spray to ease mild nasal congestion. Neti-Pot or saline nasal rinses may also help. Nasal gel sprays can help reduce nasal dryness.  Biotene mouthwash can be helpful to protect your teeth if you experience frequent dry mouth.  Dry mouth may be a sign of air escaping out of your mouth or out of the mask in the case of a full face mask.  Speak with your provider if you expect that is the case.     Take a nasal decongestant to relieve more severe nasal or sinus congestion.  Do not use  Afrin (oxymetazoline) nasal spray more than 3 days in a row.  Speak with your sleep doctor if your nasal congestion is chronic.    Use a heated humidifier that fits your CPAP model to enhance your breathing comfort. Adjust the heat setting up if you get a dry nose or throat, down if you get condensation in the hose or mask.  Position the CPAP lower than you so that any condensation in the hose drains back into the machine rather than towards the mask.    Try a system that uses nasal pillows if traditional masks give you problems.    Clean your mask, tubing and headgear once a week. Make sure the equipment dries fully.    Regularly check and replace the filters for your CPAP unit and humidifier.    Work closely with your sleep doctor and your CPAP supplier to make sure that you have the machine, mask and air pressure setting that works best for you.    BESIDES CPAP, WHAT OTHER THERAPIES ARE THERE?    Postioning devices if you only have the snoring or apnea while on your back    Dental devices if your condition is mild    Nasal valves may be effective though experience is limited    Weight loss if you are overweight    Surgery in limited cases where devices are not acceptable or there are problems with structures in the nose and throat  If treated with one of these alternative options, further evaluation is necessary to ensure that the therapy is effective. This may require some form of repeat testing.    Healthy Lifestyle:  Healthy diet, exercise and limit alcohol: Not only will excessive alcohol increase your weight over time, but it irritates the throat tissues and make them swell, shrinking the airway and causing snoring. Drinking alcohol should be limited and stopped within 3-4 hours before going to bed.   Stop smoking: (Red swollen throat, heat, nicotine), also irritates and swells the airway, among numerous other negative health consequences.  Positioning Device  This example shows a pillow that straps around  the waist. It may be appropriate for those whose sleep study shows milder sleep apnea that occurs primarily when lying flat on one's back. Preliminary studies have shown benefit but effectiveness at home should be verified.                      Oral Appliance  These are examples of two of many custom-made devices that are more likely to work in mild sleep apnea                                                Oral appliances are dental mouth pieces that fit very much like a sports mouth guards or removable orthodontic retainers. They are used to treat snoring and obstructive sleep apnea . The device prevents the airway from collapsing by either holding the tongue or supporting the jaw in a forward position. Since oral appliances are non-invasive and easy to use, they may be considered as an early treatment option. Oral appliance therapy (OAT) involves the customization, selection, fabrication, fitting, adjustments and long-term follow-up care of specially designed oral devices, worn during sleep, which reposition the lower jaw and tongue base forward to maintain an open airway.  Custom made oral appliances are proven to be more effective than over-the-counter devices. Therefore, the over-the-counter devices are recommended not to be used as a screening tool nor as a therapeutic option.     Who gets a dental device?  Oral appliance therapy can be used as an alternative to CPAP therapy for the treatment of mild to moderate sleep apnea and for those patients who prefer OAT to CPAP. Oral appliance therapy is a first line therapy for the treatment of primary snoring. Additionally, OAT is an option for those that cannot tolerate CPAP as therapy or who have experienced insufficient surgical results.                 Possible side effects?  Frequent but minor side effects include: excessive salivation, dry mouth, discomfort of teeth and jaw and temporary changes in the patient s bite.  Potential complications include: jaw  pain, permanent occlusal changes and TMJ symptoms.  The above mentioned side effects and complications can be recognized and managed by dentists trained in dental sleep medicine.   Finding a dentist that practices dental sleep medicine  Specific training is available through the American Academy of Dental Sleep Medicine for dentists interested in working in the field of sleep. To find a dentist who is educated in the field of sleep and the use of oral appliances, near you, visit the Web site of the American Academy of Dental Sleep Medicine; also see http://www.accpstorage.org/newOrganization/patients/oralAppliances.pdf   To search for a dentist certified in these practices:  Http://aadsm.org/FindADentist.aspx?1  Http://www.accpstorage.org/newOrganization/patients/oralAppliances.pdf    Weight Loss:    Some patients may experience reduction or elimination of sleep apnea with weight loss.  Though there are significant health benefits from weight loss, long-term weight loss is very difficult to achieve- studies show success with dietary management in less that 10% of people.     If you are interested in dietary weight loss, you should review the options discussed at the National Institutes of Health patient information site:     Http:/www.health.nih.gov/topic/WeightLossDieting    Bariatric programs offer counseling in all methods of weight loss:    Http:/www.uofedicalcenter.org/Specialties/WeightLossSurgeryandMedicalMgmt/htm    Surgery:  There are a number of surgeries that have been attempted to treat apnea. In general, surgical options are usually reserved for cases in which there is a physical abnormality contributing to obstruction or other treatment options are ineffective or not tolerated. Most surgical options are either unreliable or quite invasive. One of the more common procedures is:  Uvulopalatopharyngoplasty: In this procedure, the uvula (the finger-like tissue that hangs in the back of the throat), part  "of the soft palate (the tissue that the uvula is attached to), and sometimes the tonsils or adenoids are removed. The efficacy of this surgery is around 30-50% .  After surgery, complications may include:  Sleepiness and sleep apnea related to post-surgery medication   Swelling, infection and bleeding   A sore throat and/or difficulty swallowing   Drainage of secretions into the nose and a nasal quality to the voice. English language speech does not seem to be affected by this surgery.   Narrowing of the airway in the nose and throat (hence constricting breathing) snoring and even iatrogenically caused sleep apnea. By cutting the tissues, excess scar tissue can \"tighten\" the airway and make it even smaller than it was before UPPP.  Patients who have had the uvula removed will become unable to correctly speak [place] or any other language that has a uvular 'r' phoneme.    Surgeries to help resolve nasal congestion may help reduce the severity of apnea slightly. Nasal congestion does not cause apnea on its own, so these surgeries are usually not performed just for NEHA.  They may be worth considering if the nasal congestion is significantly bothersome independent of apnea.            Follow-ups after your visit        Additional Services     SLEEP PSYCHOLOGY REFERRAL       Referral Urgency: Next available    Dr. Francisco Carvalho is at the following clinics on the following days:  [facility] - [address] [place], [place]        Thursday and Friday (PLEASE CALL [phone] to schedule an appointment).  [facility] [address] [place]       Wednesdays   (PLEASE CALL [phone] to schedule an appointment).     Please be aware that coverage of these services is subject to the terms and limitations of your health insurance plan. Call member services at your health plan with any benefit or coverage questions.     Please bring the following to your appointment:  >> List of current " medications   >> This referral request   >> Any documents/labs given to you for this referral                  Follow-up notes from your care team     Return in about 2 weeks (around 5/22/2017).      Your next 10 appointments already scheduled     May 08, 2017  8:00 AM CDT   [facility]    [address] [facility] [address]   [phone]           Patient must bring picture ID.  Patient should be prepared to give a urine specimen  Please do not eat 10-12 hours before your appointment if you are coming in fasting for labs on lipids, cholesterol, or glucose (sugar).  Pregnant women should follow their Care Team instructions. Water with medications is okay. Do not drink coffee or other fluids.   If you have concerns about taking  your medications, please ask at office or if scheduling via [facility], send a message by clicking on Secure Messaging, Message Your Care Team.            May 09, 2017  8:00 PM CDT   PSG Split with BK BED 4   [facility] ([facility] [place]    [address] [address]   [phone]            May 12, 2017  1:00 PM CDT   New Sleep Patient with Francisco Carvalho PsyD   [facility] ([facility] [place]    [address] [address]   [phone]            May 25, 2017 10:30 AM CDT   Return Sleep Patient with RICKIE Patel   [facility] ([facility] [place])    [address] [address]   [phone]              Future tests that were ordered for you today     Open Future Orders        Priority Expected Expires Ordered    Comprehensive Sleep Study Routine  11/4/2017 5/8/2017    Ferritin Routine  7/7/2017 5/8/2017            Who to contact     If you have questions or need follow up  "information about today's clinic visit or your schedule please contact [facility] directly at [phone].  Normal or non-critical lab and imaging results will be communicated to you by [employer], letter or phone within 4 business days after the clinic has received the results. If you do not hear from us within 7 days, please contact the clinic through [employer] or phone. If you have a critical or abnormal lab result, we will notify you by phone as soon as possible.  Submit refill requests through [employer]GIMI or call your pharmacy and they will forward the refill request to us. Please allow 3 business days for your refill to be completed.          Additional Information About Your Visit        IP CommerceharAfterSteps Information     XGIMI lets you send messages to your doctor, view your test results, renew your prescriptions, schedule appointments and more. To sign up, go to [email or web address] . Click on \"Log in\" on the left side of the screen, which will take you to the Welcome page. Then click on \"Sign up Now\" on the right side of the page.     You will be asked to enter the access code listed below, as well as some personal information. Please follow the directions to create your username and password.     Your access code is: [email or web address]  Expires: 7/3/2017  5:00 PM     Your access code will  in 90 days. If you need help or a new code, please call your [place] clinic or [phone].        Care EveryWhere ID     This is your Care EveryWhere ID. This could be used by other organizations to access your [place] medical records  [id number]        Your Vitals Were     Pulse Height Pulse Oximetry BMI (Body Mass Index)          65 1.803 m (5' 11\") 96% 35.34 kg/m2         Blood Pressure from Last 3 Encounters:   17 118/81   17 120/82   17 (!) 156/98    Weight from Last 3 Encounters:   17 114.9 kg (253 lb 6.4 oz)   17 117.9 kg (260 lb)   17 115.2 kg (254 lb)              We " Performed the Following     SLEEP EVALUATION & MANAGEMENT REFERRAL - ADULT     SLEEP PSYCHOLOGY REFERRAL          Today's Medication Changes          These changes are accurate as of: 5/8/17  7:53 AM.  If you have any questions, ask your nurse or doctor.               Start taking these medicines.        Dose/Directions    zolpidem 5 MG tablet   Commonly known as:  AMBIEN   Started by:  Orlando Nicole PA        Take tablet by mouth 15 minutes prior to sleep, for Sleep Study   Quantity:  1 tablet   Refills:  0            Where to get your medicines      Some of these will need a paper prescription and others can be bought over the counter.  Ask your nurse if you have questions.     Bring a paper prescription for each of these medications     zolpidem 5 MG tablet                Primary Care Provider Office Phone # Fax #    Sharad Maya PA-C [phone]       [facility] [place] [address]  [place] [address]        Thank you!     Thank you for choosing [facility]  for your care. Our goal is always to provide you with excellent care. Hearing back from our patients is one way we can continue to improve our services. Please take a few minutes to complete the written survey that you may receive in the mail after your visit with us. Thank you!             Your Updated Medication List - Protect others around you: Learn how to safely use, store and throw away your medicines at www.disposemymeds.org.          This list is accurate as of: 5/8/17  7:53 AM.  Always use your most recent med list.                   Brand Name Dispense Instructions for use    aspirin 81 MG tablet     30 tablet    Take 1 tablet (81 mg) by mouth daily       buPROPion 300 MG 24 hr tablet    WELLBUTRIN XL    90 tablet    Take 1 tablet (300 mg) by mouth every morning       carvedilol 12.5 MG tablet    COREG    180 tablet    Take 1 tablet (12.5 mg) by mouth 2 times daily (with meals) Taking once daily        cetirizine 10 MG tablet    zyrTEC     Take 10 mg by mouth daily       lisinopril-hydrochlorothiazide 10-12.5 MG per tablet    PRINZIDE/ZESTORETIC    30 tablet    Take 1 tablet by mouth daily       nitroglycerin 0.4 MG sublingual tablet    NITROSTAT    25 tablet    Place 1 tablet (0.4 mg) under the tongue every 5 minutes as needed for chest pain If still having symptoms after 3 doses call 911.       zolpidem 5 MG tablet    AMBIEN    1 tablet    Take tablet by mouth 15 minutes prior to sleep, for Sleep Study

## 2017-05-08 NOTE — PATIENT INSTRUCTIONS
Your BMI is Body mass index is 35.34 kg/(m^2).  Weight management is a personal decision.  If you are interested in exploring weight loss strategies, the following discussion covers the approaches that may be successful. Body mass index (BMI) is one way to tell whether you are at a healthy weight, overweight, or obese. It measures your weight in relation to your height.  A BMI of 18.5 to 24.9 is in the healthy range. A person with a BMI of 25 to 29.9 is considered overweight, and someone with a BMI of 30 or greater is considered obese. More than two-thirds of American adults are considered overweight or obese.  Being overweight or obese increases the risk for further weight gain. Excess weight may lead to heart disease and diabetes.  Creating and following plans for healthy eating and physical activity may help you improve your health.  Weight control is part of healthy lifestyle and includes exercise, emotional health, and healthy eating habits. Careful eating habits lifelong are the mainstay of weight control. Though there are significant health benefits from weight loss, long-term weight loss with diet alone may be very difficult to achieve- studies show long-term success with dietary management in less than 10% of people. Attaining a healthy weight may be especially difficult to achieve in those with severe obesity. In some cases, medications, devices and surgical management might be considered.  What can you do?  If you are overweight or obese and are interested in methods for weight loss, you should discuss this with your provider.     Consider reducing daily calorie intake by 500 calories.     Keep a food journal.     Avoiding skipping meals, consider cutting portions instead.    Diet combined with exercise helps maintain muscle while optimizing fat loss. Strength training is particularly important for building and maintaining muscle mass. Exercise helps reduce stress, increase energy, and improves fitness.  "Increasing exercise without diet control, however, may not burn enough calories to loose weight.       Start walking three days a week 10-20 minutes at a time    Work towards walking thirty minutes five days a week     Eventually, increase the speed of your walking for 1-2 minutes at time    In addition, we recommend that you review healthy lifestyles and methods for weight loss available through the National Institutes of Health patient information sites:  http://win.niddk.nih.gov/publications/index.htm    And look into health and wellness programs that may be available through your health insurance provider, employer, local community center, or antonette club.    Weight management plan: Patient was referred to their PCP to discuss a diet and exercise plan.    Provider : Orlando Nicole  Contact Information: [facility] [phone]    Rust Points:  1. What is Obstructive Sleep Apnea (NEHA)? NEHA is the most common type of sleep apnea. Apnea literally means, \"without breath.\" It is characterized by repetitive pauses in breathing, despite continued effort to breathe, and is usually associated with a reduction in blood oxygen saturation. Apneas can last 10 to over 60 seconds. It is caused by narrowing or collapse of the upper airway as muscles relax during sleep. A number of things can make apnea worse, including: sleeping on your back, having alcohol in the evening, smoking, asthma, allergies, nasal congestion, and weight gain.  2. What are the consequences of NEHA? Symptoms include: daytime sleepiness- possibly increasing the risk of falling asleep while driving, unrefreshing/restless sleep, snoring, insomnia, waking frequently to urinate, waking with heartburn or reflux, reduced concentration and memory, and morning headaches. Other health consequences may include development of high blood pressure. Untreated NEHA also can contribute to heart disease, stroke and diabetes.   3. What are the treatment " options? In most situations, sleep apnea is a lifelong disease that must be managed with daily therapy. Continuous Positive Airway (CPAP) is the most reliable treatment. A mouthguard to hold your jaw forward is usually the next most reliable option. Other options include postioning devices (to keep you off your back), nasal valves, tongue retaining device, weight loss, surgery. There is more detail about these options toward the end of this document.  4. What are the most important things to remember about using CPAP?     WHERE CAN I FIND MORE INFORMATION?    American Academy of Sleep Medicine Patient information on sleep disorders:  http://yoursleep.aasmnet.org    CPAP-  WHY AND HOW?                                 Continuous positive airway pressure, or CPAP, is the most effective treatment for obstructive sleep apnea. It works by using air pressure to hold your throat open. A decision to use CPAP is a major step forward in the pursuit of a healthier life. The successful use of CPAP will help you breathe easier, sleep better and live healthier. Using CPAP can be a positive experience if you keep these charles points in mind:  1. Commitment  CPAP is not a quick fix for your problem. It involves a long-term commitment to improve your sleep and your health.    2. Communication  Stay in close communication with both your sleep doctor and your CPAP supplier. Ask lots of questions and seek help when you need it.    3. Consistency  Use CPAP all night, every night and for every nap. You will receive the maximum health benefits from CPAP when you use it every time that you sleep. This will also make it easier for your body to adjust to the treatment.    4. Correction  The first machine and mask that you try may not be the best ones for you. Work with your sleep doctor and your CPAP supplier to make corrections to your equipment selection. Ask about trying a different type of machine or mask if you have ongoing problems. Make  "sure that your mask is a good fit and learn to use your equipment properly.    5. Challenge  Tell a family member or close friend to ask you each morning if you used your CPAP the previous night. Have someone to challenge you to give it your best effort.    6. Connection   Your adjustment to CPAP will be easier if you are able to connect with others who use the same treatment. Ask your sleep doctor if there is a support group in your area for people who have sleep apnea, or look for one on the Internet.  7. Comfort   Increase your level of comfort by using a saline spray, decongestant or heated humidifier if CPAP irritates your nose, mouth or throat. Use your unit's \"ramp\" setting to slowly get used to the air pressure level. There may be soft pads you can buy that will fit over your mask straps. Look on www.CPAP.com for accessories such as these straps, a pillow contoured for side-sleeping with CPAP, longer hoses, hose covers to reduce condensation, or stands to keep the hose out of your way.                   8. Cleaning   Clean your mask, tubing and headgear on a regular basis. Put this time in your schedule so that you don't forget to do it. Check and replace the filters for your CPAP unit and humidifier.    9. Completion   Although you are never finished with CPAP therapy, you should reward yourself by celebrating the completion of your first month of treatment. Expect this first month to be your hardest period of adjustment. It will involve some trial and error as you find the machine, mask and pressure settings that are right for you.    10. Continuation  After your first month of treatment, continue to make a daily commitment to use your CPAP all night, every night and for every nap.    CPAP-Tips to starting with success:  Begin using your CPAP for short periods of time during the day while you watch TV or read. This eliminates the pressure of trying to fall asleep with it when it is still a new " sensation.    Use CPAP every night and for every nap. Using it less often reduces the health benefits and makes it harder for your body to get used to it.    Newer CPAP models are virtually silent; however, if you find the sound of your CPAP machine to be bothersome, place the unit under your bed to dampen the sound.     Make small adjustments to your mask, tubing, straps and headgear until you get the right fit. Tightening the mask may actually worsen the leak.  If it leaves significant marks on your face or irritates the bridge of your nose, it may not be the best mask for you.  Speak with the person who supplied the mask and consider trying other masks. Insurances will allow you to try different masks during the first month of starting CPAP.  Insurance also covers a new mask, hose and filter about every 3-6 months.    Use a saline nasal spray to ease mild nasal congestion. Neti-Pot or saline nasal rinses may also help. Nasal gel sprays can help reduce nasal dryness.  Biotene mouthwash can be helpful to protect your teeth if you experience frequent dry mouth.  Dry mouth may be a sign of air escaping out of your mouth or out of the mask in the case of a full face mask.  Speak with your provider if you expect that is the case.     Take a nasal decongestant to relieve more severe nasal or sinus congestion.  Do not use Afrin (oxymetazoline) nasal spray more than 3 days in a row.  Speak with your sleep doctor if your nasal congestion is chronic.    Use a heated humidifier that fits your CPAP model to enhance your breathing comfort. Adjust the heat setting up if you get a dry nose or throat, down if you get condensation in the hose or mask.  Position the CPAP lower than you so that any condensation in the hose drains back into the machine rather than towards the mask.    Try a system that uses nasal pillows if traditional masks give you problems.    Clean your mask, tubing and headgear once a week. Make sure the  equipment dries fully.    Regularly check and replace the filters for your CPAP unit and humidifier.    Work closely with your sleep doctor and your CPAP supplier to make sure that you have the machine, mask and air pressure setting that works best for you.    BESIDES CPAP, WHAT OTHER THERAPIES ARE THERE?    Postioning devices if you only have the snoring or apnea while on your back    Dental devices if your condition is mild    Nasal valves may be effective though experience is limited    Weight loss if you are overweight    Surgery in limited cases where devices are not acceptable or there are problems with structures in the nose and throat  If treated with one of these alternative options, further evaluation is necessary to ensure that the therapy is effective. This may require some form of repeat testing.    Healthy Lifestyle:  Healthy diet, exercise and limit alcohol: Not only will excessive alcohol increase your weight over time, but it irritates the throat tissues and make them swell, shrinking the airway and causing snoring. Drinking alcohol should be limited and stopped within 3-4 hours before going to bed.   Stop smoking: (Red swollen throat, heat, nicotine), also irritates and swells the airway, among numerous other negative health consequences.  Positioning Device  This example shows a pillow that straps around the waist. It may be appropriate for those whose sleep study shows milder sleep apnea that occurs primarily when lying flat on one's back. Preliminary studies have shown benefit but effectiveness at home should be verified.                      Oral Appliance  These are examples of two of many custom-made devices that are more likely to work in mild sleep apnea                                                Oral appliances are dental mouth pieces that fit very much like a sports mouth guards or removable orthodontic retainers. They are used to treat snoring and obstructive sleep apnea . The device  prevents the airway from collapsing by either holding the tongue or supporting the jaw in a forward position. Since oral appliances are non-invasive and easy to use, they may be considered as an early treatment option. Oral appliance therapy (OAT) involves the customization, selection, fabrication, fitting, adjustments and long-term follow-up care of specially designed oral devices, worn during sleep, which reposition the lower jaw and tongue base forward to maintain an open airway.  Custom made oral appliances are proven to be more effective than over-the-counter devices. Therefore, the over-the-counter devices are recommended not to be used as a screening tool nor as a therapeutic option.     Who gets a dental device?  Oral appliance therapy can be used as an alternative to CPAP therapy for the treatment of mild to moderate sleep apnea and for those patients who prefer OAT to CPAP. Oral appliance therapy is a first line therapy for the treatment of primary snoring. Additionally, OAT is an option for those that cannot tolerate CPAP as therapy or who have experienced insufficient surgical results.                 Possible side effects?  Frequent but minor side effects include: excessive salivation, dry mouth, discomfort of teeth and jaw and temporary changes in the patient s bite.  Potential complications include: jaw pain, permanent occlusal changes and TMJ symptoms.  The above mentioned side effects and complications can be recognized and managed by dentists trained in dental sleep medicine.   Finding a dentist that practices dental sleep medicine  Specific training is available through the American Academy of Dental Sleep Medicine for dentists interested in working in the field of sleep. To find a dentist who is educated in the field of sleep and the use of oral appliances, near you, visit the Web site of the American Academy of Dental Sleep Medicine; also see  http://www.accpstorage.org/newOrganization/patients/oralAppliances.pdf   To search for a dentist certified in these practices:  Http://aadsm.org/FindADentist.aspx?1  Http://www.accpstorage.org/newOrganization/patients/oralAppliances.pdf    Weight Loss:    Some patients may experience reduction or elimination of sleep apnea with weight loss.  Though there are significant health benefits from weight loss, long-term weight loss is very difficult to achieve- studies show success with dietary management in less that 10% of people.     If you are interested in dietary weight loss, you should review the options discussed at the National Institutes of Health patient information site:     Http:/www.health.nih.gov/topic/WeightLossDieting    Bariatric programs offer counseling in all methods of weight loss:    Http:/www.[facility].org/Specialties/WeightLossSurgeryandMedicalMgmt/htm    Surgery:  There are a number of surgeries that have been attempted to treat apnea. In general, surgical options are usually reserved for cases in which there is a physical abnormality contributing to obstruction or other treatment options are ineffective or not tolerated. Most surgical options are either unreliable or quite invasive. One of the more common procedures is:  Uvulopalatopharyngoplasty: In this procedure, the uvula (the finger-like tissue that hangs in the back of the throat), part of the soft palate (the tissue that the uvula is attached to), and sometimes the tonsils or adenoids are removed. The efficacy of this surgery is around 30-50% .  After surgery, complications may include:  Sleepiness and sleep apnea related to post-surgery medication   Swelling, infection and bleeding   A sore throat and/or difficulty swallowing   Drainage of secretions into the nose and a nasal quality to the voice. English language speech does not seem to be affected by this surgery.   Narrowing of the airway in the nose and throat (hence  "constricting breathing) snoring and even iatrogenically caused sleep apnea. By cutting the tissues, excess scar tissue can \"tighten\" the airway and make it even smaller than it was before UPPP.  Patients who have had the uvula removed will become unable to correctly speak [place] or any other language that has a uvular 'r' phoneme.    Surgeries to help resolve nasal congestion may help reduce the severity of apnea slightly. Nasal congestion does not cause apnea on its own, so these surgeries are usually not performed just for NEHA.  They may be worth considering if the nasal congestion is significantly bothersome independent of apnea.      "

## 2017-05-08 NOTE — PROGRESS NOTES
"  Sleep Consultation:    Date on this visit: 5/8/2017    Russell Miller is sent by Annelise Aguilar for a sleep consultation regarding sleep disordered breathing.    Primary Physician: Sharad Maya     Chief Complaint   Patient presents with     Consult     doesn't sleep well, doesn't sleep through the night.  previous study, no treatment recommended 10 yrs ago.       Russell is currently unemployed, he goes to sleep at 12:00 AM during the week. He wakes up at between 9 and 11 AM without an alarm. He falls asleep in 2-4 hours.  Russell has, denies difficulty falling asleep due to an overactive mind.  He wakes up 2-4 times a night for 30-60 minutes before falling back to sleep.  Russell wakes up to go to the bathroom and uncertain reasons.  On weekends, Russell goes to sleep at 12:00 AM.  He wakes up at between 9 and 11 AM without an alarm. He falls asleep in 2-4 hours.  Patient gets an average of 6-12 hours of sleep per night. He does not feel refreshed by sleep.     Patient does read in bed and does not use electronics in bed and watch TV in bed.     Russell does not do shift work.      Russell does snore every night and snoring is loud. Patient does not have a regular bed partner. There is report of snoring.  He does not have witnessed apneas.  Patient sleeps on his side. He has frequent morning dry mouth, denies  morning headaches. He reports \"achy\" legs in bed that drives him to move his legs. Motion does alleviate his symptoms. He has symptoms of RLS almost every night and it takes him about 30 minutes to resolve.  Russell denies any bruxism, sleep walking, sleep talking, dream enactment, sleep paralysis, cataplexy and hypnogogic/hypnopompic hallucinations.    Russell has reflux at night, denies difficulty breathing through his nose and claustrophobia.      Russell has gained 30-40 pounds in the last year.  Patient describes themself as neither a morning or night person.  He would prefer to go to sleep at 10:00 PM and wake up at " 9:00 AM.  Patient's [place] Sleepiness score 5/24 inconsistent with excessive  daytime sleepiness.  He has fatigue.     Russell naps 5-7 times per week for 30-90 minutes, feels unrefreshed after naps. He takes some inadvertant naps.  He denies closing eyes, dozing and falling asleep while driving. Patient was counseled on the importance of driving while alert, to pull over if drowsy, or nap before getting into the vehicle if sleepy.  He uses 1-2 cups/day of coffee, 4-6 sodas/day. Last caffeine intake is usually between 6-9 PM.    Allergies:    No Known Allergies    Medications:    Current Outpatient Prescriptions   Medication Sig Dispense Refill     cetirizine (ZYRTEC) 10 MG tablet Take 10 mg by mouth daily       zolpidem (AMBIEN) 5 MG tablet Take tablet by mouth 15 minutes prior to sleep, for Sleep Study 1 tablet 0     buPROPion (WELLBUTRIN XL) 300 MG 24 hr tablet Take 1 tablet (300 mg) by mouth every morning 90 tablet 1     carvedilol (COREG) 12.5 MG tablet Take 1 tablet (12.5 mg) by mouth 2 times daily (with meals) Taking once daily 180 tablet 1     lisinopril-hydrochlorothiazide (PRINZIDE/ZESTORETIC) 10-12.5 MG per tablet Take 1 tablet by mouth daily 30 tablet 1     aspirin 81 MG tablet Take 1 tablet (81 mg) by mouth daily 30 tablet      nitroglycerin (NITROSTAT) 0.4 MG SL tablet Place 1 tablet (0.4 mg) under the tongue every 5 minutes as needed for chest pain If still having symptoms after 3 doses call 911. 25 tablet 0       Problem List:  Patient Active Problem List    Diagnosis Date Noted     Non morbid obesity due to excess calories 04/04/2017     Priority: Medium     Advanced directives, counseling/discussion 01/03/2017     Priority: Medium     Advance Care Planning 1/3/2017: ACP Review of Chart / Resources Provided:  Reviewed chart for advance care plan.  Russell Miller has been provided information and resources to begin or update their advance care plan.  Added by Jacquelyn FROST (generalized  anxiety disorder) 09/01/2016     Priority: Medium     Moderate single current episode of major depressive disorder (H) 09/01/2016     Priority: Medium     Essential hypertension 04/22/2016     Priority: Medium     Psychophysiological insomnia 04/22/2016     Priority: Medium     Other male erectile dysfunction 04/22/2016     Priority: Medium     Gastroesophageal reflux disease without esophagitis 01/07/2016     Priority: Medium     Mitral valve insufficiency 03/26/2015     Priority: Medium     severe       Paroxysmal atrial fibrillation (H) 03/26/2015     Priority: Medium     Overview:   History Afib with RVR at the time of hospitalization for cardiogenic shock in 2014 2014 PAF noted in the past, previously on amiodarone        Posttraumatic stress disorder 09/24/2014     Priority: Medium     Nonischemic cardiomyopathy (H) 05/29/2014     Priority: Medium     Overview:   3/14 presents to hospital in [place] with CHF - EF 30% - cath showed mild disease   5/14 admit to [facility] with CHF  5/14 Admit to [facility] with CHF - Echo EF 35-40% with moderate MR  9/14 Echo - EF 45%, LVE,  severe MR, PFO (ANW)  Study Date: 02/24/2016  Interpretation Summary  Normal LV geometry without regional wall motion abnormality and LVEF at the lower limits of normal, 52%. Borderline diastolic dyfunction  Mild biatrial dialtation. RA pressure 8 mmHg, PA systolic pressure probably normal (fair quality waveform)       Attention deficit disorder 11/13/2008     Priority: Medium     Combined drug dependence, excluding opioid, in remission (H) 08/24/2007     Priority: Medium        Past Medical/Surgical History:  Past Medical History:   Diagnosis Date     MI (myocardial infarction) (H)      PUD (peptic ulcer disease)      Past Surgical History:   Procedure Laterality Date     BACK SURGERY  11/5/2014    laminectomy     COLONOSCOPY WITH CO2 INSUFFLATION N/A 3/8/2016    Procedure: COLONOSCOPY WITH CO2 INSUFFLATION;  Surgeon: Karsten Allred MD;   "Location: [facility] OR     ENDOSCOPY UPPER, COLONOSCOPY, COMBINED N/A 3/8/2016    Procedure: COMBINED ENDOSCOPY UPPER, COLONOSCOPY;  Surgeon: Karsten Allred MD;  Location: [facility] OR     ESOPHAGOSCOPY, GASTROSCOPY, DUODENOSCOPY (EGD), COMBINED N/A 3/8/2016    Procedure: COMBINED ESOPHAGOSCOPY, GASTROSCOPY, DUODENOSCOPY (EGD), BIOPSY SINGLE OR MULTIPLE;  Surgeon: Karsten Allred MD;  Location: [facility] OR     KNEE SURGERY Right 2013     SHOULDER SURGERY Left 9/14/2012    arthroscopic       Social History:  Social History     Social History     Marital status:      Spouse name: N/A     Number of children: N/A     Years of education: N/A     Occupational History     Not on file.     Social History Main Topics     Smoking status: Former Smoker     Smokeless tobacco: Never Used     Alcohol use No      Comment: occasional beer     Drug use: Yes     Special: Marijuana      Comment: marijuana - occasional use     Sexual activity: Yes     Partners: Female     Other Topics Concern     Parent/Sibling W/ Cabg, Mi Or Angioplasty Before 65f 55m? Yes     MI/CABG x2 @ age 53     Social History Narrative       Family History:  Family History   Problem Relation Age of Onset     Heart Failure Mother      Hypertension Mother      CEREBROVASCULAR DISEASE Mother      \"numerous\"     Heart Failure Father      Hypertension Father      Myocardial Infarction Father 53     CABG x 2     DIABETES No family hx of      Hyperlipidemia No family hx of      Other Cancer No family hx of      Prostate Cancer No family hx of      Colon Cancer No family hx of        Review of Systems:  A complete review of systems reviewed by me is negative with the exeption of what has been mentioned in the history of present illness.  CONSTITUTIONAL: NEGATIVE for weight gain/loss, fever, chills, sweats or night sweats, drug allergies.  EYES: NEGATIVE for changes in vision, blind spots, double vision.  ENT: NEGATIVE for ear pain, sore throat, sinus pain, post-nasal " "drip, runny nose, bloody nose  CARDIAC: NEGATIVE for fast heartbeats or fluttering in chest, chest pain or pressure, breathlessness when lying flat, swollen legs or swollen feet.  NEUROLOGIC: NEGATIVE headaches, weakness or numbness in the arms or legs.  DERMATOLOGIC: NEGATIVE for rashes, new moles or change in mole(s)  PULMONARY: NEGATIVE SOB at rest, SOB with activity, dry cough, productive cough, coughing up blood, wheezing or whistling when breathing.    GASTROINTESTINAL: NEGATIVE for nausea or vomitting, loose or watery stools, fat or grease in stools, constipation, abdominal pain, bowel movements black in color or blood noted.  GENITOURINARY: NEGATIVE for pain during urination, blood in urine, urinating more frequently than usual, irregular menstrual periods.  MUSCULOSKELETAL: NEGATIVE for muscle pain, bone or joint pain, swollen joints.  ENDOCRINE: NEGATIVE for increased thirst or urination, diabetes.  LYMPHATIC: NEGATIVE for swollen lymph nodes, lumps or bumps in the breasts or nipple discharge.    Physical Examination:  Vitals: /81  Pulse 65  Ht 1.803 m (5' 11\")  Wt 114.9 kg (253 lb 6.4 oz)  SpO2 96%  BMI 35.34 kg/m2  BMI= Body mass index is 35.34 kg/(m^2).    Neck Cir (cm): 45 cm    [place] Total Score 5/8/2017   Total score - [place] 5       GENERAL APPEARANCE: alert and no distress  EYES: Eyes grossly normal to inspection, PERRL and conjunctivae and sclerae normal  HENT: ear canals and TM's normal, nose and mouth without ulcers or lesions, oropharynx crowded and tongue base enlarged  NECK: no asymmetry, masses, or scars  RESP: lungs clear to auscultation - no rales, rhonchi or wheezes  CV: regular rates and rhythm and normal S1 S2, no S3 or S4  MS: extremities normal- no gross deformities noted  NEURO: Normal strength and tone, mentation intact and speech normal  PSYCH: mentation appears normal and affect normal/bright  Mallampati Class: IV.  Tonsillar Stage: 1  hidden by pillars.    TSH "   Date Value Ref Range Status   09/01/2016 1.90 0.40 - 4.00 mU/L Final   Last Basic Metabolic Panel:  Lab Results   Component Value Date     04/04/2017      Lab Results   Component Value Date    POTASSIUM 3.9 04/04/2017     Lab Results   Component Value Date    CHLORIDE 107 04/04/2017     Lab Results   Component Value Date    AP 9.1 04/04/2017     Lab Results   Component Value Date    CO2 27 04/04/2017     Lab Results   Component Value Date    BUN 14 04/04/2017     Lab Results   Component Value Date    CR 0.94 04/04/2017     Lab Results   Component Value Date    GLC 94 04/04/2017       Impression/Plan:    1. High probability obstructive sleep apnea with loud snoring, non-refreshing sleep, fatigue, sleep maintenance insomnia, crowded oropharynx, large neck circumference(45 cm) and co-morbid HTN. STOP-BANG score of 7/8. Recommend Polysomnogram (using 4% desaturation/Medicare/2012 AASM 1B scoring rules) to evaluate for obstructive sleep apnea. Ambien if needed to facilitate sleep. Patient is a poor candidate for Home Sleep Testing due to insomnia.  Patient was counseled in alternative therapies for management of sleep apnea, including weight loss strategies and surgical management    2. Insomnia, sleep onset and sleep maintenance, likely due to a variety of factors including anxiety, depression, inadequate sleep hygiene, RLS, though untreated obstructive sleep apnea and marijuana may be implicated in sleep maintenance difficulties. Can't rule out delayed sleep phase syndrome as well. Today, we discussed the pros and cons of pharmacological versus cognitive behavioral treatment (CBT-I) for treatment of insomnia. Patient is interested in CBT-I and a referral placed.      3. Restless legs syndrome (RLS), we will check his ferritin and treat with supplemental iron if it is low. We may consider gabapentin or a dopamine agonist if ferritin is normal.     Literature provided regarding sleep apnea, restless leg  syndrome, sleep hygiene and insomnia.      He will follow up with me in approximately two weeks after his sleep study has been completed to review the results and discuss plan of care.       Polysomnography reviewed.  Obstructive sleep apnea reviewed.  Complications of untreated sleep apnea were reviewed.    Orlando Nicole PA-C    CC: Annelise Maya

## 2017-05-09 ENCOUNTER — THERAPY VISIT (OUTPATIENT)
Dept: SLEEP MEDICINE | Facility: CLINIC | Age: 62
End: 2017-05-09
Payer: COMMERCIAL

## 2017-05-09 DIAGNOSIS — R06.00 DYSPNEA AND RESPIRATORY ABNORMALITY: ICD-10-CM

## 2017-05-09 DIAGNOSIS — G47.33 OSA (OBSTRUCTIVE SLEEP APNEA): Chronic | ICD-10-CM

## 2017-05-09 DIAGNOSIS — G25.81 RESTLESS LEGS SYNDROME (RLS): ICD-10-CM

## 2017-05-09 DIAGNOSIS — E66.09 NON MORBID OBESITY DUE TO EXCESS CALORIES: ICD-10-CM

## 2017-05-09 DIAGNOSIS — R53.83 MALAISE AND FATIGUE: ICD-10-CM

## 2017-05-09 DIAGNOSIS — G47.00 INSOMNIA, UNSPECIFIED TYPE: ICD-10-CM

## 2017-05-09 DIAGNOSIS — R53.81 MALAISE AND FATIGUE: ICD-10-CM

## 2017-05-09 DIAGNOSIS — I10 ESSENTIAL HYPERTENSION: ICD-10-CM

## 2017-05-09 DIAGNOSIS — G47.10 EXCESSIVE SLEEPINESS: ICD-10-CM

## 2017-05-09 DIAGNOSIS — R06.89 DYSPNEA AND RESPIRATORY ABNORMALITY: ICD-10-CM

## 2017-05-09 PROCEDURE — 95810 POLYSOM 6/> YRS 4/> PARAM: CPT | Performed by: INTERNAL MEDICINE

## 2017-05-09 NOTE — MR AVS SNAPSHOT
After Visit Summary   5/9/2017    Russell Miller    MRN: [id number]           Patient Information     Date Of Birth          1955        Visit Information        Provider Department      5/9/2017 8:00 PM BK BED 4 [facility]        Today's Diagnoses     Excessive sleepiness        Insomnia, unspecified type        Non morbid obesity due to excess calories        Restless legs syndrome (RLS)        Essential hypertension        Dyspnea and respiratory abnormality        Malaise and fatigue           Follow-ups after your visit        Your next 10 appointments already scheduled     May 12, 2017  1:00 PM CDT   New Sleep Patient with Francisco Carvalho PsyD   [facility] ([facility]    [address]  [place] [id number] [phone]            May 25, 2017 10:30 AM CDT   Return Sleep Patient with RICKIE Patel   [facility] ([facility])    [address]  [place] [id number] [phone]              Who to contact     If you have questions or need follow up information about today's clinic visit or your schedule please contact [facility] directly at [phone].  Normal or non-critical lab and imaging results will be communicated to you by MyChart, letter or phone within 4 business days after the clinic has received the results. If you do not hear from us within 7 days, please contact the clinic through [employer] or phone. If you have a critical or abnormal lab result, we will notify you by phone as soon as possible.  Submit refill requests through [employer] or call your pharmacy and they will forward the refill request to us. Please allow 3 business days for your refill to be completed.          Additional Information About Your Visit        ProDeaf Information     [employer] lets you send messages to your doctor, view your  "test results, renew your prescriptions, schedule appointments and more. To sign up, go to [email or web address] . Click on \"Log in\" on the left side of the screen, which will take you to the Welcome page. Then click on \"Sign up Now\" on the right side of the page.     You will be asked to enter the access code listed below, as well as some personal information. Please follow the directions to create your username and password.     Your access code is: [email or web address]  Expires: 7/3/2017  5:00 PM     Your access code will  in 90 days. If you need help or a new code, please call your [place] clinic or [phone].        Care EveryWhere ID     This is your Care EveryWhere ID. This could be used by other organizations to access your [place] medical records  [id number]         Blood Pressure from Last 3 Encounters:   17 118/81   17 120/82   17 (!) 156/98    Weight from Last 3 Encounters:   17 114.9 kg (253 lb 6.4 oz)   17 117.9 kg (260 lb)   17 115.2 kg (254 lb)              We Performed the Following     Comprehensive Sleep Study        Primary Care Provider Office Phone # Fax #    Sharad Maya PA-C [phone]       [facility] [place] [address] [facility] [address]        Thank you!     Thank you for choosing [facility]  for your care. Our goal is always to provide you with excellent care. Hearing back from our patients is one way we can continue to improve our services. Please take a few minutes to complete the written survey that you may receive in the mail after your visit with us. Thank you!             Your Updated Medication List - Protect others around you: Learn how to safely use, store and throw away your medicines at www.disposemymeds.org.          This list is accurate as of: 17 11:59 PM.  Always use your most recent med list.                   Brand Name Dispense Instructions for use    aspirin 81 MG tablet     30 " tablet    Take 1 tablet (81 mg) by mouth daily       buPROPion 300 MG 24 hr tablet    WELLBUTRIN XL    90 tablet    Take 1 tablet (300 mg) by mouth every morning       carvedilol 12.5 MG tablet    COREG    180 tablet    Take 1 tablet (12.5 mg) by mouth 2 times daily (with meals) Taking once daily       cetirizine 10 MG tablet    zyrTEC     Take 10 mg by mouth daily       lisinopril-hydrochlorothiazide 10-12.5 MG per tablet    PRINZIDE/ZESTORETIC    30 tablet    Take 1 tablet by mouth daily       nitroglycerin 0.4 MG sublingual tablet    NITROSTAT    25 tablet    Place 1 tablet (0.4 mg) under the tongue every 5 minutes as needed for chest pain If still having symptoms after 3 doses call 911.       zolpidem 5 MG tablet    AMBIEN    1 tablet    Take tablet by mouth 15 minutes prior to sleep, for Sleep Study

## 2017-05-12 PROBLEM — F99 MENTAL HEALTH DISORDER: Chronic | Status: ACTIVE | Noted: 2017-05-12

## 2017-05-12 PROBLEM — F99 MENTAL HEALTH DISORDER: Status: ACTIVE | Noted: 2017-05-12

## 2017-05-12 PROBLEM — G47.33 OSA (OBSTRUCTIVE SLEEP APNEA): Chronic | Status: ACTIVE | Noted: 2017-05-12

## 2017-05-12 PROBLEM — G25.81 RESTLESS LEGS SYNDROME (RLS): Status: ACTIVE | Noted: 2017-05-12

## 2017-05-12 NOTE — PROCEDURES
" SLEEP STUDY INTERPRETATION  POLYSOMNOGRAPHY REPORT      Patient: Russell Miller  YOB: 1955  Study Date: 5/9/2017  MRN: [id number]  Referring Provider: Annelise Aguilar MD  Ordering Provider: Orlando Nicole PA-C        Indications for Polysomnography: The patient is a 62 y old Male who is 5' 11\" and weighs 253.0 lbs.  His BMI is 35.4, [place] sleepiness scale 5.0 and neck size is 45.0.  A diagnostic polysomnogram was performed to evaluate for loud snoring, non-refreshing sleep, fatigue, sleep maintenance insomnia, crowded oropharynx, large neck circumference(45 cm) and co-morbid HTN, history of non-ischemic cardiomyopathy with heart failure.    Polysomnogram Data:  A full night polysomnogram recorded the standard physiologic parameters including EEG, EOG, EMG, ECG, nasal and oral airflow.  Respiratory parameters of chest and abdominal movements were recorded with respiratory inductance plethysmography.  Oxygen saturation was recorded by pulse oximetry.      Sleep Architecture:  The total recording time of the polysomnogram was 396.9 minutes.  The total sleep time was 301.5 minutes.  Sleep latency was increased at 41.5 minutes without the use of a sleep aid.  REM latency was 134.0 minutes.  Arousal index was 46.0 arousals per hour.  Sleep efficiency was decreased at 76.0%.  Wake after sleep onset was 53.5 minutes.  The patient spent 42.6% of total sleep time in Stage N1, 39.1% in Stage N2, 1.0% in Stages N3, and 17.2% in REM.  Time in REM supine was 0  minutes.    Respiration:    Events - The polysomnogram revealed a presence of 1 obstructive, 6 central, and 0 mixed apneas resulting in an apnea index of 1.4 events per hour.  There were 110 hypopneas resulting in a hypopnea index of 21.9 events per hour.  The combined apnea/hypopnea index was 23.3 events per hour.  The REM AHI was 24.2 events per hour.  The supine AHI was 18.2 events per hour.  The RERA index was 6.0 events per hour.   The RDI was 29.3 events " per hour.    Snoring - was reported as absent or mild.    Respiratory rate and pattern - was notable for normal respiratory rate and pattern.    Sustained Sleep Associated Hypoventilation - Transcutaneous carbon dioxide monitoring was not used    Sleep Associated Hypoxemia - Baseline oxygen saturation was 93.2%. Lowest oxygen saturation was 81.5%.  Time spent less than or equal to 88% was 2.8 minutes.  Time spent less than or equal to 89% was 8.4 minutes.  29.3 6.0 23.3     Movement Activity:    Periodic Limb Activity - There were 32 PLMs during the entire study. The PLM index was 6.4 movements per hour.  The PLM Arousal Index was 3.0 per hour.    REM EMG Activity - Excessive transient / sustained muscle activity was not present.    Nocturnal Behavior - Abnormal sleep related behaviors were not noted    Bruxism - None apparent.    Cardiac Summary: PVCs  were noted.      Assessment:     Moderate obstructive sleep apnea    Periodic limb movements of sleep    Recommendations:    Given history of cardiomyopathy recommend repeat polysomnography with full night titration of positive airway pressure therapy for the control of sleep disordered breathing.    Alternatively, treatment could be empirically initiated with Auto-titrating PAP therapy with a range of 5 - 18 cmH2O. Recommend clinical follow up with sleep management team.    Patient may be a candidate for dental appliance through referral to Sleep Dentistry for the treatment of obstructive sleep apnea and/or socially disruptive snoring.    If devices are not acceptable or effective, patient may benefit from evaluation of possible surgical options.  If he is interested, would recommend referral to specialized ENT-Sleep provider.    Advise regarding the risks of drowsy driving.    Suggest optimizing sleep schedule.    Weight management (if BMI > 30).    Pharmacologic therapy should be used for management of restless legs syndrome only if present and clinically  indicated and not based on the presence of periodic limb movements alone.        _____________________________________   Kayden Svee, MD             Range(%) Time in range (min) Time in range (%) Time in or below range (min) Time in or below range (%)   0.0 - 89.0 8.4 2.1% 8.4 2.1%   0.0 - 88.0 2.8 0.7% 2.8 0.7%      23.3 18.2 24.2

## 2017-05-26 ENCOUNTER — OFFICE VISIT (OUTPATIENT)
Dept: PSYCHIATRY | Facility: CLINIC | Age: 62
End: 2017-05-26
Payer: COMMERCIAL

## 2017-05-26 VITALS
SYSTOLIC BLOOD PRESSURE: 104 MMHG | RESPIRATION RATE: 16 BRPM | BODY MASS INDEX: 34.3 KG/M2 | DIASTOLIC BLOOD PRESSURE: 77 MMHG | TEMPERATURE: 97.5 F | WEIGHT: 245 LBS | HEIGHT: 71 IN | HEART RATE: 80 BPM

## 2017-05-26 DIAGNOSIS — F33.1 MAJOR DEPRESSIVE DISORDER, RECURRENT EPISODE, MODERATE (H): Primary | ICD-10-CM

## 2017-05-26 DIAGNOSIS — F41.1 GAD (GENERALIZED ANXIETY DISORDER): ICD-10-CM

## 2017-05-26 PROCEDURE — 99214 OFFICE O/P EST MOD 30 MIN: CPT | Performed by: CLINICAL NURSE SPECIALIST

## 2017-05-26 ASSESSMENT — ANXIETY QUESTIONNAIRES
GAD7 TOTAL SCORE: 11
2. NOT BEING ABLE TO STOP OR CONTROL WORRYING: MORE THAN HALF THE DAYS
5. BEING SO RESTLESS THAT IT IS HARD TO SIT STILL: MORE THAN HALF THE DAYS
7. FEELING AFRAID AS IF SOMETHING AWFUL MIGHT HAPPEN: NOT AT ALL
6. BECOMING EASILY ANNOYED OR IRRITABLE: SEVERAL DAYS
3. WORRYING TOO MUCH ABOUT DIFFERENT THINGS: MORE THAN HALF THE DAYS
IF YOU CHECKED OFF ANY PROBLEMS ON THIS QUESTIONNAIRE, HOW DIFFICULT HAVE THESE PROBLEMS MADE IT FOR YOU TO DO YOUR WORK, TAKE CARE OF THINGS AT HOME, OR GET ALONG WITH OTHER PEOPLE: SOMEWHAT DIFFICULT
1. FEELING NERVOUS, ANXIOUS, OR ON EDGE: SEVERAL DAYS

## 2017-05-26 ASSESSMENT — PATIENT HEALTH QUESTIONNAIRE - PHQ9: 5. POOR APPETITE OR OVEREATING: NEARLY EVERY DAY

## 2017-05-26 NOTE — MR AVS SNAPSHOT
After Visit Summary   5/26/2017    Russell Miller    MRN: [id number]           Patient Information     Date Of Birth          1955        Visit Information        Provider Department      5/26/2017 11:15 AM Annelise Aguilar APRN [facility]        Care Instructions    Treatment Plan:  Continue bupropion (Wellbutrin)  mg in the morning.   Follow up with sleep medicine on 6-9-2017.     Follow up in 1-2 months.     Begin daily exercise - walk.     - Recommend patient discuss medications with their pharmacist. Risks and benefits for medications were discussed including, but not limited to, side effects.   - Safety plan was reviewed; to the ER as needed or call after hours crisis line; [phone]  - Education and counseling was done regarding use of medications, psychotherapy options  - Call [phone] for appointment or to speak to a nurse.    -Office hours: Monday through Thursday 8:00 am to 4:30 pm; Friday 8:00 am to Noon.   - Patient received a copy of this Treatment Plan today.          Follow-ups after your visit        Your next 10 appointments already scheduled     Jun 09, 2017  1:30 PM CDT   Return Sleep Patient with RICKIE Patel   [facility] ([facility])    [address] [place] [address] [phone]              Who to contact     If you have questions or need follow up information about today's clinic visit or your schedule please contact [facility] directly at [phone].  Normal or non-critical lab and imaging results will be communicated to you by [employer], letter or phone within 4 business days after the clinic has received the results. If you do not hear from us within 7 days, please contact the clinic through Freshpl[employer] or phone. If you have a critical or abnormal lab result, we will notify you by phone as soon as possible.  Submit refill requests through [employer]  "or call your pharmacy and they will forward the refill request to us. Please allow 3 business days for your refill to be completed.          Additional Information About Your Visit        MyChart Information     [employer] lets you send messages to your doctor, view your test results, renew your prescriptions, schedule appointments and more. To sign up, go to [email or web address] . Click on \"Log in\" on the left side of the screen, which will take you to the Welcome page. Then click on \"Sign up Now\" on the right side of the page.     You will be asked to enter the access code listed below, as well as some personal information. Please follow the directions to create your username and password.     Your access code is: [email or web address]  Expires: 7/3/2017  5:00 PM     Your access code will  in 90 days. If you need help or a new code, please call your [place] clinic or [phone].        Care EveryWhere ID     This is your [facility] EveryWhere ID. This could be used by other organizations to access your [place] medical records  [id number]        Your Vitals Were     Pulse Temperature Respirations Height BMI (Body Mass Index)       80 97.5  F (36.4  C) (Tympanic) 16 5' 11\" (1.803 m) 34.17 kg/m2        Blood Pressure from Last 3 Encounters:   17 104/77   17 118/81   17 120/82    Weight from Last 3 Encounters:   17 245 lb (111.1 kg)   17 253 lb 6.4 oz (114.9 kg)   17 260 lb (117.9 kg)              Today, you had the following     No orders found for display       Primary Care Provider Office Phone # Fax #    Sharad Maya PA-C [phone]       [facility] [place] [address] [place] [address]        Thank you!     Thank you for choosing [facility]  for your care. Our goal is always to provide you with excellent care. Hearing back from our patients is one way we can continue to improve our services. Please take a few minutes to complete the written " survey that you may receive in the mail after your visit with us. Thank you!             Your Updated Medication List - Protect others around you: Learn how to safely use, store and throw away your medicines at www.disposemymeds.org.          This list is accurate as of: 5/26/17 11:38 AM.  Always use your most recent med list.                   Brand Name Dispense Instructions for use    aspirin 81 MG tablet     30 tablet    Take 1 tablet (81 mg) by mouth daily       buPROPion 300 MG 24 hr tablet    WELLBUTRIN XL    90 tablet    Take 1 tablet (300 mg) by mouth every morning       carvedilol 12.5 MG tablet    COREG    180 tablet    Take 1 tablet (12.5 mg) by mouth 2 times daily (with meals) Taking once daily       cetirizine 10 MG tablet    zyrTEC     Take 10 mg by mouth daily       lisinopril-hydrochlorothiazide 10-12.5 MG per tablet    PRINZIDE/ZESTORETIC    30 tablet    Take 1 tablet by mouth daily       nitroglycerin 0.4 MG sublingual tablet    NITROSTAT    25 tablet    Place 1 tablet (0.4 mg) under the tongue every 5 minutes as needed for chest pain If still having symptoms after 3 doses call 911.

## 2017-05-26 NOTE — NURSING NOTE
"Chief Complaint   Patient presents with     Medication Reconciliation     not using the Ambien       Initial /77 (BP Location: Left arm, Patient Position: Chair, Cuff Size: Adult Large)  Pulse 80  Temp 97.5  F (36.4  C) (Tympanic)  Resp 16  Ht 5' 11\" (1.803 m)  Wt 245 lb (111.1 kg)  BMI 34.17 kg/m2 Estimated body mass index is 34.17 kg/(m^2) as calculated from the following:    Height as of this encounter: 5' 11\" (1.803 m).    Weight as of this encounter: 245 lb (111.1 kg).  Medication Reconciliation: complete  "

## 2017-05-26 NOTE — PATIENT INSTRUCTIONS
Treatment Plan:  Continue bupropion (Wellbutrin)  mg in the morning.   Follow up with sleep medicine on 6-9-2017.     Follow up in 1-2 months.     Begin daily exercise - walk.     - Recommend patient discuss medications with their pharmacist. Risks and benefits for medications were discussed including, but not limited to, side effects.   - Safety plan was reviewed; to the ER as needed or call after hours crisis line; [phone]  - Education and counseling was done regarding use of medications, psychotherapy options  - Call [phone] for appointment or to speak to a nurse.    -Office hours: Monday through Thursday 8:00 am to 4:30 pm; Friday 8:00 am to Noon.   - Patient received a copy of this Treatment Plan today.

## 2017-05-26 NOTE — PROGRESS NOTES
"      Psychiatric Progress Note    Name: Russell Miller  Date: 5/26/2017  Length of Visit: 30 minutes  MRN: [id number]      Current Outpatient Prescriptions   Medication Sig     cetirizine (ZYRTEC) 10 MG tablet Take 10 mg by mouth daily     buPROPion (WELLBUTRIN XL) 300 MG 24 hr tablet Take 1 tablet (300 mg) by mouth every morning     carvedilol (COREG) 12.5 MG tablet Take 1 tablet (12.5 mg) by mouth 2 times daily (with meals) Taking once daily     lisinopril-hydrochlorothiazide (PRINZIDE/ZESTORETIC) 10-12.5 MG per tablet Take 1 tablet by mouth daily     aspirin 81 MG tablet Take 1 tablet (81 mg) by mouth daily     nitroglycerin (NITROSTAT) 0.4 MG SL tablet Place 1 tablet (0.4 mg) under the tongue every 5 minutes as needed for chest pain If still having symptoms after 3 doses call 911.     zolpidem (AMBIEN) 5 MG tablet Take tablet by mouth 15 minutes prior to sleep, for Sleep Study (Patient not taking: Reported on 5/26/2017)     No current facility-administered medications for this visit.        Therapist:  None    PHQ-9:  PHQ-9 score:    PHQ-9 SCORE 5/26/2017   Total Score 11       MARGOT-7:  MARGOT-7 SCORE 9/26/2016 4/11/2017 5/26/2017   Total Score 21 13 11           Interim History:  Patient returns for follow up from initial appointment 4-. Olanzapine (Zyprexa) was discontinued as patient was sleeping too much. Bupropion (Wellbutrin)  mg daily was continued and patient was referred for sleep consult. He met with sleep medicine on 5-8-2017 and sleep study was recommended.     Patient states he completed the study a couple of weeks ago, notes are not available. Patient reports he was told he has sleep apnea and is scheduled to return for treatment on 6-9-2017. Patient reports he is sleeping less and is sleeping \"better\" since discontinuing olanzapine (Zyprexa). Patient reports his mood is \"good\" and is feeling better, depression \"is definitely better\". Patient reports he quit cannabis cold turkey two weeks ago " "and is managing well without it.         Past Medical History:   Diagnosis Date     Mitral valve insufficiency     Severe, resolved on echo 2016     PUD (peptic ulcer disease)        10 point ROS is negative except for those listed above.    Vital Signs:   /77 (BP Location: Left arm, Patient Position: Chair, Cuff Size: Adult Large)  Pulse 80  Temp 97.5  F (36.4  C) (Tympanic)  Resp 16  Ht 5' 11\" (1.803 m)  Wt 245 lb (111.1 kg)  BMI 34.17 kg/m2      Mental Status Assessment:  Appearance:  Well groomed      Behavior/relationship to examiner/demeanor:  Cooperative, engaged and pleasant  Motor activity:  Normal  Gait:  Normal   Speech:  Normal in volume, articulation, coherence   Mood (subjective report):  \"Good\"  Affect (objective appearance):  Mood congruent  Thought Process (Associations):  Logical, linear and goal directed  Thought content:  No evidence of suicidal or homicidal ideation,          no overt psychosis and                    patient does not appear to be responding to internal stimuli  Oriented to person, place, date/time   Attention Span and concentration: Intact   Memory:  Short-term memory intact and Long-term memory; Intact  Language:  Fluent   Fund of Knowledge/Intelligence:  Average  Use of language: Intact   Abstraction:  Normal  Insight:  Adequate  Judgment:  Adequate for safety    DSM DIAGNOSIS:  Attention-Deficit/Hyperactivity Disorder  314.01 (F90.2) Combined presentation - patient reported.  296.32 Major Depressive Disorder, Recurrent Episode, Moderate _ and With anxious distress  300.02 (F41.1) Generalized Anxiety Disorder    Assessment:  Patient has NEHA and depressive symptoms - low energy and fatigue are likely to be improved with treatment of NEHA. Patient is doing well taking bupropion (Wellbutrin) and his mood continues to improve.     Patient is looking for purpose in his life. We discussed this and a referral was made to care coordination to connect patient with volunteer " resources with working with kids with chemical dependency issues.     Medication side effects and alternatives were reviewed.     Treatment Plan:  Continue bupropion (Wellbutrin)  mg in the morning.   Follow up with sleep medicine on 6-9-2017.     Follow up in 1-2 months.     Begin daily exercise - walk.     Care coordination referral to volunteer with kids with CD issues.     - Recommend patient discuss medications with their pharmacist. Risks and benefits for medications were discussed including, but not limited to, side effects.   - Safety plan was reviewed; to the ER as needed or call after hours crisis line; [phone]  - Education and counseling was done regarding use of medications, psychotherapy options  - Call [phone] for appointment or to speak to a nurse.    -Office hours: Monday through Thursday 8:00 am to 4:30 pm; Friday 8:00 am to Noon.   - Patient received a copy of this Treatment Plan today.    Patient will continue to be seen for ongoing consultation and stabilization.      Signed:  Annelise Aguilar RN, MS, CNS-BC

## 2017-05-27 ASSESSMENT — PATIENT HEALTH QUESTIONNAIRE - PHQ9: SUM OF ALL RESPONSES TO PHQ QUESTIONS 1-9: 11

## 2017-05-27 ASSESSMENT — ANXIETY QUESTIONNAIRES: GAD7 TOTAL SCORE: 11

## 2017-05-30 ENCOUNTER — CARE COORDINATION (OUTPATIENT)
Dept: CARE COORDINATION | Facility: CLINIC | Age: 62
End: 2017-05-30

## 2017-05-30 NOTE — PROGRESS NOTES
Clinic Care Coordination Contact  [facility]/Voicemail    Referral Source: PCP  Clinical Data: Care Coordinator Outreach  Outreach attempted x 1.  Left message on voicemail with call back information and requested return call.  Plan: Care Coordinator will mail out care coordination introduction letter with care coordinator contact information and explanation of care coordination services. Care Coordinator will try to reach patient again in 1-2 weeks.    ROMAN Martell  Care Coordination  [facility] [place]  [phone]  [email or web address]

## 2017-05-30 NOTE — LETTER
[facility]  [address] [address] [facility] [place] [address]  [phone]    May 30, 2017      Russell Miller  [address] [place], [place] [address] [address]        Dear Russell,    I am a SW Care Coordinator, working with the care team at your clinic including your primary care provider, Sharad Maya.  I would like to  introduce you to [facility] Care Coordination Program. The Care Coordinator is a nurse or  who understands the health care system. The goal of Care Coordination is to help you manage your health and improve access to the [facility] system in the most efficient manner.      The registered nurse (RN) assists you in meeting your health care goals by providing education, coordinating services, and strengthening the communication among your providers. The  (SW) is available to assist in financial, behavioral, psychosocial, and chemical dependency and counseling/psychiatric resources.     Please feel free to contact me at [phone]. I look forward to your call and partnering with you to achieve your optimal state of wellness.  We at [facility] are focused on providing you with the highest-quality healthcare experience possible and that all starts with you.       Sincerely,         ROMAN Martell  Care Coordination  [facility] [place]  [phone]  [email or web address]

## 2017-06-09 ENCOUNTER — OFFICE VISIT (OUTPATIENT)
Dept: SLEEP MEDICINE | Facility: CLINIC | Age: 62
End: 2017-06-09
Payer: COMMERCIAL

## 2017-06-09 VITALS
SYSTOLIC BLOOD PRESSURE: 99 MMHG | HEART RATE: 62 BPM | DIASTOLIC BLOOD PRESSURE: 76 MMHG | OXYGEN SATURATION: 96 % | BODY MASS INDEX: 33.88 KG/M2 | WEIGHT: 242 LBS | HEIGHT: 71 IN

## 2017-06-09 DIAGNOSIS — G47.33 OSA (OBSTRUCTIVE SLEEP APNEA): Primary | ICD-10-CM

## 2017-06-09 DIAGNOSIS — F51.04 PSYCHOPHYSIOLOGICAL INSOMNIA: ICD-10-CM

## 2017-06-09 DIAGNOSIS — G25.81 RESTLESS LEGS SYNDROME (RLS): ICD-10-CM

## 2017-06-09 DIAGNOSIS — I42.9 CARDIOMYOPATHY (H): ICD-10-CM

## 2017-06-09 PROCEDURE — 99214 OFFICE O/P EST MOD 30 MIN: CPT | Performed by: PHYSICIAN ASSISTANT

## 2017-06-09 RX ORDER — ZOLPIDEM TARTRATE 10 MG/1
TABLET ORAL
Qty: 1 TABLET | Refills: 0 | Status: SHIPPED | OUTPATIENT
Start: 2017-06-09 | End: 2017-06-13

## 2017-06-09 NOTE — PROGRESS NOTES
Sleep Study Follow-Up Visit:    Date on this visit: 6/9/2017    Russell Miller comes in today for follow-up of his sleep study done on 5/9/2017 at the [facility] for loud snoring, non-refreshing sleep, fatigue, sleep maintenance insomnia, crowded oropharynx, large neck circumference(45 cm) and co-morbid HTN, history of non-ischemic cardiomyopathy with heart failure.    Diagnostic PSG  Sleep Architecture:  The total recording time of the polysomnogram was 396.9 minutes.  The total sleep time was 301.5 minutes.  Sleep latency was increased at 41.5 minutes without the use of a sleep aid.  REM latency was 134.0 minutes.  Arousal index was 46.0 arousals per hour.  Sleep efficiency was decreased at 76.0%.  Wake after sleep onset was 53.5 minutes.  The patient spent 42.6% of total sleep time in Stage N1, 39.1% in Stage N2, 1.0% in Stages N3, and 17.2% in REM.  Time in REM supine was 0  minutes.     Respiration:    Events - The polysomnogram revealed a presence of 1 obstructive, 6 central, and 0 mixed apneas resulting in an apnea index of 1.4 events per hour.  There were 110 hypopneas resulting in a hypopnea index of 21.9 events per hour.  The combined apnea/hypopnea index was 23.3 events per hour.  The REM AHI was 24.2 events per hour.  The supine AHI was 18.2 events per hour.  The RERA index was 6.0 events per hour.   The RDI was 29.3 events per hour.    Snoring - was reported as absent or mild.    Respiratory rate and pattern - was notable for normal respiratory rate and pattern.    Sustained Sleep Associated Hypoventilation - Transcutaneous carbon dioxide monitoring was not used    Sleep Associated Hypoxemia - Baseline oxygen saturation was 93.2%. Lowest oxygen saturation was 81.5%.  Time spent less than or equal to 88% was 2.8 minutes.  Time spent less than or equal to 89% was 8.4 minutes.  Movement Activity:    Periodic Limb Activity - There were 32 PLMs during the entire study. The PLM index was  6.4 movements per hour.  The PLM Arousal Index was 3.0 per hour.    REM EMG Activity - Excessive transient / sustained muscle activity was not present.    Nocturnal Behavior - Abnormal sleep related behaviors were not noted    Bruxism - None apparent.     Cardiac Summary: PVCs  were noted.    These findings were reviewed with patient.     Past medical/surgical history, family history, social history, medications and allergies were reviewed.      Problem List:  Patient Active Problem List    Diagnosis Date Noted     Mental health disorder 05/12/2017     Priority: Medium     history of psychiatric co-morbidities including bipolar disorder, depression, PTSD, and polysubstance abuse,       NEHA (obstructive sleep apnea)- moderate (AHI 23) 05/12/2017     Priority: Medium     Study Date: 5/9/2017- (253.0 lb) apnea/hypopnea index was 23.3 events per hour.  The REM AHI was 24.2 events per hour.  The supine AHI was 18.2 events per hour.  The RERA index was 6.0 events per hour.   The RDI was 29.3 events per hour. Lowest oxygen saturation was 81.5%.  Time spent less than or equal to 88% was 2.8 minutes.  Time spent less than or equal to 89% was 8.4 minutes. PLM index was 6.4 movements per hour.       Non morbid obesity due to excess calories 04/04/2017     Priority: Medium     Advanced directives, counseling/discussion 01/03/2017     Priority: Medium     Advance Care Planning 1/3/2017: ACP Review of Chart / Resources Provided:  Reviewed chart for advance care plan.  Russell Miller has been provided information and resources to begin or update their advance care plan.  Added by Jacquelyn Sy            MARGOT (generalized anxiety disorder) 09/01/2016     Priority: Medium     Moderate single current episode of major depressive disorder (H) 09/01/2016     Priority: Medium     Essential hypertension 04/22/2016     Priority: Medium     Psychophysiological insomnia 04/22/2016     Priority: Medium     Other male erectile dysfunction  "04/22/2016     Priority: Medium     Gastroesophageal reflux disease without esophagitis 01/07/2016     Priority: Medium     Paroxysmal atrial fibrillation (H) 03/26/2015     Priority: Medium     Overview:   History Afib with RVR at the time of hospitalization for cardiogenic shock in 2014 2014 PAF noted in the past, previously on amiodarone        Posttraumatic stress disorder 09/24/2014     Priority: Medium     History of cardiomyopathy 05/29/2014     Priority: Medium     3/14 hospitalized in March 2014, in [place], [place] with respiratory failure and cardiogenic shock requiring intubation and intraaortic balloon pump respectively. He was found to have severe mitral regurgitation and the left ventricular ejection fraction was 25%-30%. Cath showed mild disease   5/14 admit to [facility] with CHF  5/14 Admit to [facility] with CHF - Echo EF 35-40% with moderate MR  9/14 Echo - EF 45%, LVE,  severe MR, PFO (ANW)  Echo: 02/24/2016 Normal LV geometry without regional wall motion abnormality and LVEF at the lower limits of normal, 52%. Borderline diastolic dyfunction. Mild biatrial dialtation. RA pressure 8 mmHg, PA systolic pressure probably normal (fair quality waveform)       Attention deficit disorder 11/13/2008     Priority: Medium     Combined drug dependence, excluding opioid, in remission (H) 08/24/2007     Priority: Medium     History of substance abuse (Marijuanna, Methamphetamine, and Tobacco).        Restless legs syndrome (RLS) 05/12/2017     Priority: Low      BP 99/76  Pulse 62  Ht 1.803 m (5' 11\")  Wt 109.8 kg (242 lb)  SpO2 96%  BMI 33.75 kg/m2  Impression/Plan:    1. Moderate obstructive sleep apnea-  Treatment options discussed.   Given history of cardiomyopathy recommend repeat polysomnography with full night titration of positive airway pressure therapy for the control of sleep disordered breathing. He will bring his own Ambien to help facilitate sleep.   Morning set up if possible.     2. " Patient had elevated periodic limp movements during the study. The majority of these were not associated with cortical arousal. Patient has symptoms of RLS. Recent ferritin was within normal limits. He is not interested in pharmacological treatment. Conservative measures for RLS discussed.       Twenty-five minutes spent with patient, all of which were spent face-to-face counseling, consulting, coordinating plan of care regarding NEHA and RLS.      [place] Nicole PA-C    CC: Sharad Maya

## 2017-06-09 NOTE — MR AVS SNAPSHOT
After Visit Summary   6/9/2017    Russell Miller    MRN: [id number]           Patient Information     Date Of Birth          1955        Visit Information        Provider Department      6/9/2017 1:30 PM Orlando Nicole PA [facility]        Today's Diagnoses     NEHA (obstructive sleep apnea)    -  1    Psychophysiological insomnia        Restless legs syndrome (RLS)        Cardiomyopathy (H)          Care Instructions      Your BMI is Body mass index is 33.75 kg/(m^2).  Weight management is a personal decision.  If you are interested in exploring weight loss strategies, the following discussion covers the approaches that may be successful. Body mass index (BMI) is one way to tell whether you are at a healthy weight, overweight, or obese. It measures your weight in relation to your height.  A BMI of 18.5 to 24.9 is in the healthy range. A person with a BMI of 25 to 29.9 is considered overweight, and someone with a BMI of 30 or greater is considered obese. More than two-thirds of American adults are considered overweight or obese.  Being overweight or obese increases the risk for further weight gain. Excess weight may lead to heart disease and diabetes.  Creating and following plans for healthy eating and physical activity may help you improve your health.  Weight control is part of healthy lifestyle and includes exercise, emotional health, and healthy eating habits. Careful eating habits lifelong are the mainstay of weight control. Though there are significant health benefits from weight loss, long-term weight loss with diet alone may be very difficult to achieve- studies show long-term success with dietary management in less than 10% of people. Attaining a healthy weight may be especially difficult to achieve in those with severe obesity. In some cases, medications, devices and surgical management might be considered.  What can you do?  If you are overweight or obese and are interested  in methods for weight loss, you should discuss this with your provider.     Consider reducing daily calorie intake by 500 calories.     Keep a food journal.     Avoiding skipping meals, consider cutting portions instead.    Diet combined with exercise helps maintain muscle while optimizing fat loss. Strength training is particularly important for building and maintaining muscle mass. Exercise helps reduce stress, increase energy, and improves fitness. Increasing exercise without diet control, however, may not burn enough calories to loose weight.       Start walking three days a week 10-20 minutes at a time    Work towards walking thirty minutes five days a week     Eventually, increase the speed of your walking for 1-2 minutes at time    In addition, we recommend that you review healthy lifestyles and methods for weight loss available through the National Institutes of Health patient information sites:  http://win.niddk.nih.gov/publications/index.htm    And look into health and wellness programs that may be available through your health insurance provider, employer, local community center, or antonette club.    Weight management plan: Patient was referred to their PCP to discuss a diet and exercise plan.        Your Body mass index is 33.75 kg/(m^2).  Weight management is a personal decision.  If you are interested in exploring weight loss strategies, the following discussion covers the approaches that may be successful. Body mass index (BMI) is one way to tell whether you are at a healthy weight, overweight, or obese. It measures your weight in relation to your height.  A BMI of 18.5 to 24.9 is in the healthy range. A person with a BMI of 25 to 29.9 is considered overweight, and someone with a BMI of 30 or greater is considered obese. More than two-thirds of American adults are considered overweight or obese.  Being overweight or obese increases the risk for further weight gain. Excess weight may lead to heart disease  and diabetes.  Creating and following plans for healthy eating and physical activity may help you improve your health.  Weight control is part of healthy lifestyle and includes exercise, emotional health, and healthy eating habits. Careful eating habits lifelong are the mainstay of weight control. Though there are significant health benefits from weight loss, long-term weight loss with diet alone may be very difficult to achieve- studies show long-term success with dietary management in less than 10% of people. Attaining a healthy weight may be especially difficult to achieve in those with severe obesity. In some cases, medications, devices and surgical management might be considered.  What can you do?  If you are overweight or obese and are interested in methods for weight loss, you should discuss this with your provider.     Consider reducing daily calorie intake by 500 calories.     Keep a food journal.     Avoiding skipping meals, consider cutting portions instead.    Diet combined with exercise helps maintain muscle while optimizing fat loss. Strength training is particularly important for building and maintaining muscle mass. Exercise helps reduce stress, increase energy, and improves fitness. Increasing exercise without diet control, however, may not burn enough calories to loose weight.       Start walking three days a week 10-20 minutes at a time    Work towards walking thirty minutes five days a week     Eventually, increase the speed of your walking for 1-2 minutes at time    In addition, we recommend that you review healthy lifestyles and methods for weight loss available through the National Institutes of Health patient information sites:  http://win.niddk.nih.gov/publications/index.htm    And look into health and wellness programs that may be available through your health insurance provider, employer, local community center, or antonette club.    Weight management plan: Patient was referred to their PCP to  discuss a diet and exercise plan.  Conservative Measures for RLS (Restless Leg Syndrome):  Patients with restless leg syndrome (RLS) may be helped by nonpharmacologic approaches in addition to prescription medications.  Good sleep habits (sleep hygiene) may be helpful to patients with RLS.      Patients should avoid the following:    Extremes of sustained inactivity or excessive exercising.  However, moderate, regular exercise is recommended.    Antihistamines: include those found in most allergy, cold, and sinus preparations    Avoid late in the afternoon and evening:    Caffeine    Tobacco    Alcohol    Patients should try:    Mild stretching exercises at bedtime    Baths with water at extreme temperatures (either hot or cold)    Pastimes that promote mental activity, such as puzzles, video/computer, games, etc .          Follow-ups after your visit        Your next 10 appointments already scheduled     Jun 13, 2017  1:00 PM CDT   Office Visit with Sharad Maya PA-C   [facility] ([facility])    [id number] [place] [id number] [phone]           Bring a current list of meds and any records pertaining to this visit.  For Physicals, please bring immunization records and any forms needing to be filled out.  Please arrive 10 minutes early to complete paperwork.            Jun 14, 2017  8:00 PM CDT   PSG Titration with SLIM BED 3   [facility] ([facility]    [address] [place] [address]   [phone]            Andrea 15, 2017  9:00 AM CDT   New Sleep Patient with Francisco Carvalho PsyD   [facility] ([facility]    [address] [address]   [phone]            Jun 16, 2017  9:00 AM CDT   PAP SETUP with SLIM BLEVINS DME   [facility] ([facility])    [address] [place] [address]  "[address] [address]   [phone]            2017  9:00 AM CDT   Return Sleep Patient with RICKIE Patel   [facility] ([facility])    [address]  [facility] [address]   [phone]              Future tests that were ordered for you today     Open Future Orders        Priority Expected Expires Ordered    Comprehensive Sleep Study Routine  2017            Who to contact     If you have questions or need follow up information about today's clinic visit or your schedule please contact [facility] directly at [phone].  Normal or non-critical lab and imaging results will be communicated to you by [employer], letter or phone within 4 business days after the clinic has received the results. If you do not hear from us within 7 days, please contact the clinic through [employer] or phone. If you have a critical or abnormal lab result, we will notify you by phone as soon as possible.  Submit refill requests through [employer] or call your pharmacy and they will forward the refill request to us. Please allow 3 business days for your refill to be completed.          Additional Information About Your Visit        [employer] Information     [employer] lets you send messages to your doctor, view your test results, renew your prescriptions, schedule appointments and more. To sign up, go to [email or web address] . Click on \"Log in\" on the left side of the screen, which will take you to the Welcome page. Then click on \"Sign up Now\" on the right side of the page.     You will be asked to enter the access code listed below, as well as some personal information. Please follow the directions to create your username and password.     Your access code is: [email or web address]  Expires: 7/3/2017  5:00 PM     Your access code will  in 90 days. If you need help or a new code, please call your [facility] " "clinic or [phone].        Care EveryWhere ID     This is your Care EveryWhere ID. This could be used by other organizations to access your [facility] medical records  [id number]        Your Vitals Were     Pulse Height Pulse Oximetry BMI (Body Mass Index)          62 1.803 m (5' 11\") 96% 33.75 kg/m2         Blood Pressure from Last 3 Encounters:   06/09/17 99/76   05/26/17 104/77   05/08/17 118/81    Weight from Last 3 Encounters:   06/09/17 109.8 kg (242 lb)   05/26/17 111.1 kg (245 lb)   05/08/17 114.9 kg (253 lb 6.4 oz)                 Today's Medication Changes          These changes are accurate as of: 6/9/17  1:59 PM.  If you have any questions, ask your nurse or doctor.               Start taking these medicines.        Dose/Directions    zolpidem 10 MG tablet   Commonly known as:  AMBIEN        Take tablet by mouth 15 minutes prior to sleep, for Sleep Study   Quantity:  1 tablet   Refills:  0            Where to get your medicines      Some of these will need a paper prescription and others can be bought over the counter.  Ask your nurse if you have questions.     Bring a paper prescription for each of these medications     zolpidem 10 MG tablet                Primary Care Provider Office Phone # Fax #    Sharad Maya PA-C [phone]       [facility] [address] [facility] [address]        Thank you!     Thank you for choosing [facility]  for your care. Our goal is always to provide you with excellent care. Hearing back from our patients is one way we can continue to improve our services. Please take a few minutes to complete the written survey that you may receive in the mail after your visit with us. Thank you!             Your Updated Medication List - Protect others around you: Learn how to safely use, store and throw away your medicines at www.disposemymeds.org.          This list is accurate as of: 6/9/17  1:59 PM.  Always use your most recent med " list.                   Brand Name Dispense Instructions for use    aspirin 81 MG tablet     30 tablet    Take 1 tablet (81 mg) by mouth daily       buPROPion 300 MG 24 hr tablet    WELLBUTRIN XL    90 tablet    Take 1 tablet (300 mg) by mouth every morning       carvedilol 12.5 MG tablet    COREG    180 tablet    Take 1 tablet (12.5 mg) by mouth 2 times daily (with meals) Taking once daily       cetirizine 10 MG tablet    zyrTEC     Take 10 mg by mouth daily       lisinopril-hydrochlorothiazide 10-12.5 MG per tablet    PRINZIDE/ZESTORETIC    30 tablet    Take 1 tablet by mouth daily       nitroglycerin 0.4 MG sublingual tablet    NITROSTAT    25 tablet    Place 1 tablet (0.4 mg) under the tongue every 5 minutes as needed for chest pain If still having symptoms after 3 doses call 911.       zolpidem 10 MG tablet    AMBIEN    1 tablet    Take tablet by mouth 15 minutes prior to sleep, for Sleep Study

## 2017-06-09 NOTE — NURSING NOTE
"Chief Complaint   Patient presents with     Study Results       Initial BP 99/76  Pulse 62  Ht 1.803 m (5' 11\")  Wt 109.8 kg (242 lb)  SpO2 96%  BMI 33.75 kg/m2 Estimated body mass index is 33.75 kg/(m^2) as calculated from the following:    Height as of this encounter: 1.803 m (5' 11\").    Weight as of this encounter: 109.8 kg (242 lb).  Medication Reconciliation: complete   America Dickens, ROCHELLE      "

## 2017-06-09 NOTE — PATIENT INSTRUCTIONS
Your BMI is Body mass index is 33.75 kg/(m^2).  Weight management is a personal decision.  If you are interested in exploring weight loss strategies, the following discussion covers the approaches that may be successful. Body mass index (BMI) is one way to tell whether you are at a healthy weight, overweight, or obese. It measures your weight in relation to your height.  A BMI of 18.5 to 24.9 is in the healthy range. A person with a BMI of 25 to 29.9 is considered overweight, and someone with a BMI of 30 or greater is considered obese. More than two-thirds of American adults are considered overweight or obese.  Being overweight or obese increases the risk for further weight gain. Excess weight may lead to heart disease and diabetes.  Creating and following plans for healthy eating and physical activity may help you improve your health.  Weight control is part of healthy lifestyle and includes exercise, emotional health, and healthy eating habits. Careful eating habits lifelong are the mainstay of weight control. Though there are significant health benefits from weight loss, long-term weight loss with diet alone may be very difficult to achieve- studies show long-term success with dietary management in less than 10% of people. Attaining a healthy weight may be especially difficult to achieve in those with severe obesity. In some cases, medications, devices and surgical management might be considered.  What can you do?  If you are overweight or obese and are interested in methods for weight loss, you should discuss this with your provider.     Consider reducing daily calorie intake by 500 calories.     Keep a food journal.     Avoiding skipping meals, consider cutting portions instead.    Diet combined with exercise helps maintain muscle while optimizing fat loss. Strength training is particularly important for building and maintaining muscle mass. Exercise helps reduce stress, increase energy, and improves fitness.  Increasing exercise without diet control, however, may not burn enough calories to loose weight.       Start walking three days a week 10-20 minutes at a time    Work towards walking thirty minutes five days a week     Eventually, increase the speed of your walking for 1-2 minutes at time    In addition, we recommend that you review healthy lifestyles and methods for weight loss available through the National Institutes of Health patient information sites:  http://win.niddk.nih.gov/publications/index.htm    And look into health and wellness programs that may be available through your health insurance provider, employer, local community center, or antonette club.    Weight management plan: Patient was referred to their PCP to discuss a diet and exercise plan.        Your Body mass index is 33.75 kg/(m^2).  Weight management is a personal decision.  If you are interested in exploring weight loss strategies, the following discussion covers the approaches that may be successful. Body mass index (BMI) is one way to tell whether you are at a healthy weight, overweight, or obese. It measures your weight in relation to your height.  A BMI of 18.5 to 24.9 is in the healthy range. A person with a BMI of 25 to 29.9 is considered overweight, and someone with a BMI of 30 or greater is considered obese. More than two-thirds of American adults are considered overweight or obese.  Being overweight or obese increases the risk for further weight gain. Excess weight may lead to heart disease and diabetes.  Creating and following plans for healthy eating and physical activity may help you improve your health.  Weight control is part of healthy lifestyle and includes exercise, emotional health, and healthy eating habits. Careful eating habits lifelong are the mainstay of weight control. Though there are significant health benefits from weight loss, long-term weight loss with diet alone may be very difficult to achieve- studies show  long-term success with dietary management in less than 10% of people. Attaining a healthy weight may be especially difficult to achieve in those with severe obesity. In some cases, medications, devices and surgical management might be considered.  What can you do?  If you are overweight or obese and are interested in methods for weight loss, you should discuss this with your provider.     Consider reducing daily calorie intake by 500 calories.     Keep a food journal.     Avoiding skipping meals, consider cutting portions instead.    Diet combined with exercise helps maintain muscle while optimizing fat loss. Strength training is particularly important for building and maintaining muscle mass. Exercise helps reduce stress, increase energy, and improves fitness. Increasing exercise without diet control, however, may not burn enough calories to loose weight.       Start walking three days a week 10-20 minutes at a time    Work towards walking thirty minutes five days a week     Eventually, increase the speed of your walking for 1-2 minutes at time    In addition, we recommend that you review healthy lifestyles and methods for weight loss available through the National Institutes of Health patient information sites:  http://win.niddk.nih.gov/publications/index.htm    And look into health and wellness programs that may be available through your health insurance provider, employer, local community center, or antonette club.    Weight management plan: Patient was referred to their PCP to discuss a diet and exercise plan.  Conservative Measures for RLS (Restless Leg Syndrome):  Patients with restless leg syndrome (RLS) may be helped by nonpharmacologic approaches in addition to prescription medications.  Good sleep habits (sleep hygiene) may be helpful to patients with RLS.      Patients should avoid the following:    Extremes of sustained inactivity or excessive exercising.  However, moderate, regular exercise is  recommended.    Antihistamines: include those found in most allergy, cold, and sinus preparations    Avoid late in the afternoon and evening:    Caffeine    Tobacco    Alcohol    Patients should try:    Mild stretching exercises at bedtime    Baths with water at extreme temperatures (either hot or cold)    Pastimes that promote mental activity, such as puzzles, video/computer, games, etc .

## 2017-06-13 ENCOUNTER — OFFICE VISIT (OUTPATIENT)
Dept: FAMILY MEDICINE | Facility: CLINIC | Age: 62
End: 2017-06-13
Payer: COMMERCIAL

## 2017-06-13 VITALS
HEIGHT: 71 IN | OXYGEN SATURATION: 96 % | BODY MASS INDEX: 33.74 KG/M2 | TEMPERATURE: 98.3 F | RESPIRATION RATE: 16 BRPM | HEART RATE: 87 BPM | DIASTOLIC BLOOD PRESSURE: 82 MMHG | SYSTOLIC BLOOD PRESSURE: 113 MMHG | WEIGHT: 241 LBS

## 2017-06-13 DIAGNOSIS — K59.04 CHRONIC IDIOPATHIC CONSTIPATION: Primary | ICD-10-CM

## 2017-06-13 DIAGNOSIS — F32.1 MODERATE SINGLE CURRENT EPISODE OF MAJOR DEPRESSIVE DISORDER (H): ICD-10-CM

## 2017-06-13 DIAGNOSIS — I10 ESSENTIAL HYPERTENSION: ICD-10-CM

## 2017-06-13 PROCEDURE — 99214 OFFICE O/P EST MOD 30 MIN: CPT | Performed by: PHYSICIAN ASSISTANT

## 2017-06-13 RX ORDER — CARVEDILOL 12.5 MG/1
12.5 TABLET ORAL 2 TIMES DAILY WITH MEALS
COMMUNITY
End: 2017-06-13

## 2017-06-13 RX ORDER — LISINOPRIL/HYDROCHLOROTHIAZIDE 10-12.5 MG
1 TABLET ORAL DAILY
Qty: 90 TABLET | Refills: 1 | Status: SHIPPED | OUTPATIENT
Start: 2017-06-13 | End: 2017-12-26

## 2017-06-13 RX ORDER — BUPROPION HYDROCHLORIDE 300 MG/1
300 TABLET ORAL EVERY MORNING
Qty: 90 TABLET | Refills: 1 | Status: SHIPPED | OUTPATIENT
Start: 2017-06-13 | End: 2018-01-02

## 2017-06-13 RX ORDER — CARVEDILOL 12.5 MG/1
12.5 TABLET ORAL 2 TIMES DAILY WITH MEALS
Qty: 180 TABLET | Refills: 1 | Status: SHIPPED | OUTPATIENT
Start: 2017-06-13 | End: 2018-01-02

## 2017-06-13 RX ORDER — POLYETHYLENE GLYCOL 3350 17 G/17G
1 POWDER, FOR SOLUTION ORAL DAILY
Qty: 850 G | Refills: 6 | Status: SHIPPED | OUTPATIENT
Start: 2017-06-13 | End: 2018-01-02

## 2017-06-13 NOTE — PROGRESS NOTES
SUBJECTIVE:                                                    Russell Miller is a 62 year old male who presents to clinic today for the following health issues:      Hypertension Follow-up      Outpatient blood pressures are not being checked.    Low Salt Diet: not monitoring salt       Amount of exercise or physical activity: None    Problems taking medications regularly: No    Medication side effects: none    Diet: regular (no restrictions)      Patient noting  Chronic constipation concerns as well.   Recheck of bipolar/dep : currently notes stable mood. Although seems more irritable than normal during exam . Not suicidal. Insomnia is stable.   Problem list and histories reviewed & adjusted, as indicated.  Additional history: as documented        Reviewed and updated as needed this visit by clinical staff  Tobacco  Allergies  Meds       Reviewed and updated as needed this visit by Provider         All other systems negative except as outline above  OBJECTIVE:  Eye exam - right eye normal lid, conjunctiva, cornea, pupil and fundus, left eye normal lid, conjunctiva, cornea, pupil and fundus.  Thyroid not palpable, not enlarged, no nodules detected.  CHEST:chest clear to IPPA, no tachypnea, retractions or cyanosis and S1, S2 normal, no murmur, no gallop, rate regular.  Pulses normal. No edema   The abdomen is soft without tenderness, guarding, mass, rebound or organomegaly. Bowel sounds are normal. No CVA tenderness or inguinal adenopathy noted.    Russell was seen today for hypertension.    Diagnoses and all orders for this visit:    Chronic idiopathic constipation  -     polyethylene glycol (MIRALAX) powder; Take 17 g (1 capful) by mouth daily    Essential hypertension  -     carvedilol (COREG) 12.5 MG tablet; Take 1 tablet (12.5 mg) by mouth 2 times daily (with meals)  -     lisinopril-hydrochlorothiazide (PRINZIDE/ZESTORETIC) 10-12.5 MG per tablet; Take 1 tablet by mouth daily  -     Cancel: Basic metabolic panel   (Ca, Cl, CO2, Creat, Gluc, K, Na, BUN)    Moderate single current episode of major depressive disorder (H)  -     buPROPion (WELLBUTRIN XL) 300 MG 24 hr tablet; Take 1 tablet (300 mg) by mouth every morning      work on lifestyle modification  F/u with psych at end of mos.  Recheck of htn in 6 mos.

## 2017-06-13 NOTE — MR AVS SNAPSHOT
After Visit Summary   6/13/2017    Russell Miller    MRN: [id number]           Patient Information     Date Of Birth          1955        Visit Information        Provider Department      6/13/2017 1:00 PM Sharad Maya PA-C [facility]        Today's Diagnoses     Chronic idiopathic constipation    -  1    Essential hypertension        Moderate single current episode of major depressive disorder (H)           Follow-ups after your visit        Your next 10 appointments already scheduled     Jun 14, 2017  8:00 PM CDT   PSG Titration with SLIM BED 3   [facility] ([facility]    [address]  [facility] [address]   [phone]            Andrea 15, 2017  9:00 AM CDT   New Sleep Patient with Francisco Carvalho PsyD   [facility]    [address]  [facility] [address]   [phone]            Jun 16, 2017  9:00 AM CDT   PAP SETUP with SLIM SC DME   [facility] ([facility])    [address]  [facility] [address]   [phone]            Jul 28, 2017  9:00 AM CDT   Return Sleep Patient with RICKIE Patel   [facility] ([facility])    [address]  [facility] [address]   [phone]              Who to contact     Normal or non-critical lab and imaging results will be communicated to you by MyChart, letter or phone within 4 business days after the clinic has received the results. If you do not hear from us within 7 days, please contact the clinic through MyChart or phone. If you have a critical or abnormal lab result, we will notify you by phone as soon as possible.  Submit refill requests through [employer] or call your pharmacy and they will forward the refill request to us. Please allow 3  "business days for your refill to be completed.          If you need to speak with a  for additional information , please call: [phone]             Additional Information About Your Visit        [employer] Information     [employer] lets you send messages to your doctor, view your test results, renew your prescriptions, schedule appointments and more. To sign up, go to [email or web address] . Click on \"Log in\" on the left side of the screen, which will take you to the Welcome page. Then click on \"Sign up Now\" on the right side of the page.     You will be asked to enter the access code listed below, as well as some personal information. Please follow the directions to create your username and password.     Your access code is: [email or web address]  Expires: 7/3/2017  5:00 PM     Your access code will  in 90 days. If you need help or a new code, please call your [facility] clinic or [phone].        Care EveryWhere ID     This is your Care EveryWhere ID. This could be used by other organizations to access your [facility] medical records  [id number]        Your Vitals Were     Pulse Temperature Respirations Height Pulse Oximetry BMI (Body Mass Index)    87 98.3  F (36.8  C) (Tympanic) 16 5' 11\" (1.803 m) 96% 33.61 kg/m2       Blood Pressure from Last 3 Encounters:   17 113/82   17 99/76   17 104/77    Weight from Last 3 Encounters:   17 241 lb (109.3 kg)   17 242 lb (109.8 kg)   17 245 lb (111.1 kg)              We Performed the Following     Basic metabolic panel  (Ca, Cl, CO2, Creat, Gluc, K, Na, BUN)          Today's Medication Changes          These changes are accurate as of: 17  1:34 PM.  If you have any questions, ask your nurse or doctor.               Start taking these medicines.        Dose/Directions    polyethylene glycol powder   Commonly known as:  MIRALAX   Used for:  Chronic idiopathic constipation   Started by:  Sharad Maya PA-C        " Dose:  1 capful   Take 17 g (1 capful) by mouth daily   Quantity:  850 g   Refills:  6         These medicines have changed or have updated prescriptions.        Dose/Directions    carvedilol 12.5 MG tablet   Commonly known as:  COREG   This may have changed:  Another medication with the same name was removed. Continue taking this medication, and follow the directions you see here.   Used for:  Essential hypertension   Changed by:  Sharad Maya PA-C        Dose:  12.5 mg   Take 1 tablet (12.5 mg) by mouth 2 times daily (with meals)   Quantity:  180 tablet   Refills:  1         Stop taking these medicines if you haven't already. Please contact your care team if you have questions.     zolpidem 10 MG tablet   Commonly known as:  AMBIEN   Stopped by:  Sharad Maya PA-C                Where to get your medicines      These medications were sent to [facility]  RAF[place]  [address] [address] [place] [address]     Phone:  [phone]     buPROPion 300 MG 24 hr tablet    carvedilol 12.5 MG tablet    lisinopril-hydrochlorothiazide 10-12.5 MG per tablet    polyethylene glycol powder                Primary Care Provider Office Phone # Fax #    Sharad Maya PA-C [phone]       [address]  [place] [address]        Thank you!     Thank you for choosing [facility]  for your care. Our goal is always to provide you with excellent care. Hearing back from our patients is one way we can continue to improve our services. Please take a few minutes to complete the written survey that you may receive in the mail after your visit with us. Thank you!             Your Updated Medication List - Protect others around you: Learn how to safely use, store and throw away your medicines at www.disposemymeds.org.          This list is accurate as of: 6/13/17  1:34 PM.  Always use your most recent med list.                   Brand  Name Dispense Instructions for use    aspirin 81 MG tablet     30 tablet    Take 1 tablet (81 mg) by mouth daily       buPROPion 300 MG 24 hr tablet    WELLBUTRIN XL    90 tablet    Take 1 tablet (300 mg) by mouth every morning       carvedilol 12.5 MG tablet    COREG    180 tablet    Take 1 tablet (12.5 mg) by mouth 2 times daily (with meals)       cetirizine 10 MG tablet    zyrTEC     Take 10 mg by mouth daily       lisinopril-hydrochlorothiazide 10-12.5 MG per tablet    PRINZIDE/ZESTORETIC    90 tablet    Take 1 tablet by mouth daily       nitroglycerin 0.4 MG sublingual tablet    NITROSTAT    25 tablet    Place 1 tablet (0.4 mg) under the tongue every 5 minutes as needed for chest pain If still having symptoms after 3 doses call 911.       polyethylene glycol powder    MIRALAX    850 g    Take 17 g (1 capful) by mouth daily

## 2017-06-14 ENCOUNTER — THERAPY VISIT (OUTPATIENT)
Dept: SLEEP MEDICINE | Facility: CLINIC | Age: 62
End: 2017-06-14
Payer: COMMERCIAL

## 2017-06-14 DIAGNOSIS — G47.33 OSA (OBSTRUCTIVE SLEEP APNEA): ICD-10-CM

## 2017-06-14 DIAGNOSIS — I42.9 CARDIOMYOPATHY (H): ICD-10-CM

## 2017-06-14 PROCEDURE — 95811 POLYSOM 6/>YRS CPAP 4/> PARM: CPT | Performed by: INTERNAL MEDICINE

## 2017-06-14 NOTE — Clinical Note
PSG done at  ready for interp. Pt was supposed to be setup 6/16. But was canceled due to not finding an optimal pressure.  I will give you a couple more studies of Orlando's to help Kayden. He has 8 studies already assigned to him.

## 2017-06-14 NOTE — MR AVS SNAPSHOT
After Visit Summary   6/14/2017    Russell Miller    MRN: [id number]           Patient Information     Date Of Birth          1955        Visit Information        Provider Department      6/14/2017 8:00 PM SLIM BED 3 [facility]        Today's Diagnoses     NEHA (obstructive sleep apnea)        Cardiomyopathy (H)           Follow-ups after your visit        Your next 10 appointments already scheduled     Andrea 15, 2017  9:00 AM CDT   New Sleep Patient with Francisco Carvalho PsyD   [facility]    [address]  [place] [address]   [phone]            Jun 16, 2017  9:00 AM CDT   PAP SETUP with SLIM SC DME   [facility] ([facility])    [address]  [facility] [address]   [phone]            Jul 28, 2017  9:00 AM CDT   Return Sleep Patient with RICKIE Patel   [facility] ([facility])    [address]  [facility] [address]   [phone]              Who to contact     If you have questions or need follow up information about today's clinic visit or your schedule please contact [facility] directly at [phone].  Normal or non-critical lab and imaging results will be communicated to you by [employer], letter or phone within 4 business days after the clinic has received the results. If you do not hear from us within 7 days, please contact the clinic through [employer] or phone. If you have a critical or abnormal lab result, we will notify you by phone as soon as possible.  Submit refill requests through [employer] or call your pharmacy and they will forward the refill request to us. Please allow 3 business days for your refill to be completed.          Additional Information About Your Visit        [employer] Information     [employer] lets you send  "messages to your doctor, view your test results, renew your prescriptions, schedule appointments and more. To sign up, go to [email or web address] . Click on \"Log in\" on the left side of the screen, which will take you to the Welcome page. Then click on \"Sign up Now\" on the right side of the page.     You will be asked to enter the access code listed below, as well as some personal information. Please follow the directions to create your username and password.     Your access code is: [email or web address]  Expires: 7/3/2017  5:00 PM     Your access code will  in 90 days. If you need help or a new code, please call your [facility] clinic or [phone].        Care EveryWhere ID     This is your Care EveryWhere ID. This could be used by other organizations to access your [facility] medical records  [id number]         Blood Pressure from Last 3 Encounters:   17 113/82   17 99/76   17 104/77    Weight from Last 3 Encounters:   17 109.3 kg (241 lb)   17 109.8 kg (242 lb)   17 111.1 kg (245 lb)              We Performed the Following     Comprehensive Sleep Study        Primary Care Provider Office Phone # Fax #    Sharad Maya PA-C [phone]       [facility] [place] [address] [facility] [address]        Thank you!     Thank you for choosing [facility]  for your care. Our goal is always to provide you with excellent care. Hearing back from our patients is one way we can continue to improve our services. Please take a few minutes to complete the written survey that you may receive in the mail after your visit with us. Thank you!             Your Updated Medication List - Protect others around you: Learn how to safely use, store and throw away your medicines at www.disposemymeds.org.          This list is accurate as of: 17 11:59 PM.  Always use your most recent med list.                   Brand Name Dispense Instructions for use    aspirin " 81 MG tablet     30 tablet    Take 1 tablet (81 mg) by mouth daily       buPROPion 300 MG 24 hr tablet    WELLBUTRIN XL    90 tablet    Take 1 tablet (300 mg) by mouth every morning       carvedilol 12.5 MG tablet    COREG    180 tablet    Take 1 tablet (12.5 mg) by mouth 2 times daily (with meals)       cetirizine 10 MG tablet    zyrTEC     Take 10 mg by mouth daily       lisinopril-hydrochlorothiazide 10-12.5 MG per tablet    PRINZIDE/ZESTORETIC    90 tablet    Take 1 tablet by mouth daily       nitroglycerin 0.4 MG sublingual tablet    NITROSTAT    25 tablet    Place 1 tablet (0.4 mg) under the tongue every 5 minutes as needed for chest pain If still having symptoms after 3 doses call 911.       polyethylene glycol powder    MIRALAX    850 g    Take 17 g (1 capful) by mouth daily

## 2017-06-14 NOTE — Clinical Note
If you ever have a situation where the lab hasn't finished yet, feel free to call or [facility] me.  Happy to quickly look at a study and make recommendations if the patient is already there, even if it isn't my patient. Kale Medina

## 2017-06-15 NOTE — NURSING NOTE
Completed a all night titration PSG per provider order.    Preliminary AHI 25.  A final therapeutic PAP pressure was not achieved.    Supine REM was not seen on therapeutic pressure.    Patient reports feeling not refreshed in AM.

## 2017-06-16 ENCOUNTER — DOCUMENTATION ONLY (OUTPATIENT)
Dept: SLEEP MEDICINE | Facility: CLINIC | Age: 62
End: 2017-06-16

## 2017-06-16 DIAGNOSIS — G47.33 OSA (OBSTRUCTIVE SLEEP APNEA): ICD-10-CM

## 2017-06-16 DIAGNOSIS — G25.81 RESTLESS LEGS SYNDROME (RLS): ICD-10-CM

## 2017-06-16 DIAGNOSIS — F51.04 PSYCHOPHYSIOLOGICAL INSOMNIA: ICD-10-CM

## 2017-06-16 NOTE — PROGRESS NOTES
Sleep lab was unable to find pressures for PAP setup. I apologized letting the patient know that he will have to schedule the setup.Patient was not happy and walked out of the clinic.

## 2017-06-19 ENCOUNTER — DOCUMENTATION ONLY (OUTPATIENT)
Dept: SLEEP MEDICINE | Facility: CLINIC | Age: 62
End: 2017-06-19

## 2017-06-19 NOTE — PROCEDURES
"SLEEP STUDY INTERPRETATION  TITRATION STUDY      Patient: Russell Miller  YOB: 1955  Study Date: 6/14/2017  MRN: [id number]  Referring Provider: Annelise Aguilar MD  Ordering Provider: Orlando Nicole PA-C    Indications for Polysomnography: The patient is a 62 y old Male who is 5' 11\" and weighs 253.0 lbs.  His BMI is 35.4, [place] sleepiness scale is 5.0 and neck size is 45.0.  Relevant medical history includes HTN, polysubstance abuse, and prior non-ischemic cardiomyopathy with near-normal EF in 2016.  A diagnostic polysomnogram PAP titration was performed for NEHA.    Polysomnogram Data:  A full night polysomnogram recorded the standard physiologic parameters including EEG, EOG, EMG, ECG, nasal and oral airflow.  Respiratory parameters of chest and abdominal movements were recorded with respiratory inductance plethysmography.  Oxygen saturation was recorded by pulse oximetry.      Treatment PSG:  Sleep Architecture: Increased sleep latency, fragmented sleep, poor sleep efficiency, and absence of both N3 REM sleep despite sleep aid.  The total recording time of the study was 364.9 minutes.  The total sleep time was 220.5 minutes.  Sleep latency was increased at 47.7 minutes with the use of a sleep aid.  REM latency was - minutes.  Arousal index was increased at 61.0 arousals per hour.  Sleep efficiency was decreased at 60.4%.  Wake after sleep onset was 95.5 minutes.   The patient spent 64.4% of total sleep time in Stage N1, 35.6% in Stage N2, 0.0% in Stage N3 and 0.0% in REM.     Respiration: Unacceptable titration.    The patient was titrated at pressures ranging from 5 cmH2O up to 15 cmH2O.  The optimal pressure achieved was 15 cmH2O with a residual AHI of 20.9 events per hour.  No time in REM.     This titration was considered: unacceptable (does not meet any of the above criteria).    Respiratory rate and pattern - was normal.    Sustained Sleep Associated Hypoventilation - Transcutaneous carbon dioxide " monitoring was not used; however significant hypoventilation was not suggested by oximetry.    Sleep Associated Hypoxemia - (Greater than 5 minutes O2 sat below 89%) was not present.  Baseline oxygen saturation was 94.5%. Lowest oxygen saturation was 83.9%.  Time spent less than or equal to 88% was 0.1 minutes.  Time spent less than or equal to 89% was 0.2 minutes.    Movement Activity: Elevated PLM index with associated arousals.    Periodic Limb Activity - There were 153 PLMs during the entire study. The PLM index was 41.6 movements per hour.  The PLM Arousal Index was 10.3 per hour.    REM EMG Activity - Excessive muscle activity was not present.    Nocturnal Behavior - Abnormal sleep related behaviors were not noted.    Bruxism - None apparent.    Cardiac Summary: Summary assessment  The average pulse rate was 61.2 bpm.  The minimum pulse rate was 47.1 bpm while the maximum pulse rate was 81.0 bpm. The rhythm is normal sinus. Arrhythmias were not noted.    Assessment:     Increased sleep latency, fragmented sleep, poor sleep efficiency, and absence of both N3 REM sleep despite sleep aid.    Frequent post-arousal hypopneas.      Unacceptable titration with full facemask.  Consider alternative mask interface.    Elevated PLM index with associated arousals.    Recommendations:    Treatment of NEHA with Auto-titrating PAP therapy with a range of 6 - 12 cmH2O.  Recommend clinical follow up with sleep management team, including review of compliance measures.    Advise regarding the risks of drowsy driving.    Suggest optimizing sleep schedule and avoiding sleep deprivation.    Weight management (if BMI > 30).    Pharmacologic therapy should be used for management of restless legs syndrome only if present and clinically indicated and not based on the presence of periodic limb movements alone.    Cardiac Comments: Normal Sinus Rhythm  Diagnostic Codes:      Obstructive Sleep Apnea G47.33    Unspecified Sleep Disturbance  G47.9             _____________________________________   Electronically Signed By: Adam Navas MD 6/18/2017

## 2017-06-21 ENCOUNTER — DOCUMENTATION ONLY (OUTPATIENT)
Dept: SLEEP MEDICINE | Facility: CLINIC | Age: 62
End: 2017-06-21

## 2017-06-21 DIAGNOSIS — G47.33 OBSTRUCTIVE SLEEP APNEA: Primary | ICD-10-CM

## 2017-06-21 NOTE — PROGRESS NOTES
Patient called [facility] to schedule appointment at  Sleep Center with DME Coordinator.  I saw previous notes that patient was supposed to be setup but wasn't because there was no order placed.  I scheduled patient for Friday at 1 PM and will route this note to care team to have them write order for machine and supplies if that's what they advise for patient.

## 2017-06-21 NOTE — Clinical Note
Is this patient advised to start PAP therapy?  If so can either of you write an order.  Patient is scheduled for Friday at 1 PM with Bailee at [facility].

## 2017-06-23 ENCOUNTER — DOCUMENTATION ONLY (OUTPATIENT)
Dept: SLEEP MEDICINE | Facility: CLINIC | Age: 62
End: 2017-06-23
Payer: COMMERCIAL

## 2017-06-23 DIAGNOSIS — G25.81 RESTLESS LEGS SYNDROME (RLS): ICD-10-CM

## 2017-06-23 DIAGNOSIS — G47.33 OSA (OBSTRUCTIVE SLEEP APNEA): ICD-10-CM

## 2017-06-23 DIAGNOSIS — F51.04 PSYCHOPHYSIOLOGICAL INSOMNIA: ICD-10-CM

## 2017-06-23 PROCEDURE — 99207 ZZC NO BILLABLE SERVICE THIS VISIT: CPT

## 2017-06-23 NOTE — PROGRESS NOTES
Clinic Care Coordination Contact  Care Team Conversations    Pt contacted SW in response to letter sent 5/30/2017. Pt asked to meet with SW in clinic as he is currently suffering depression and lacks motivation.    SW will see pt in clinic May 26 @ 2pm    ROMAN Martell  Care Coordination  [place]  [phone]  [email or web address]

## 2017-06-23 NOTE — PROGRESS NOTES
Patient was offered choice of vendor and chose [facility].  Patient Russell Miller was set up at [place] on June 23, 2017. Patient received a Resmed AirSense 10 Auto. Pressures were set at 6-12 cm H2O.   Patient s ramp is 5 cm H2O for Off and FLEX/EPR is EPR, 2.  Patient received a Resmed Mask name: Airfit F20  Full Face mask Size Medium, heated tubing and heated humidifier.  Patient is enrolled in the STM Program and does need to meet compliance. Patient has a follow up on 7/28/17 with RICKIE Quinonez.    Constanza Osuna

## 2017-06-26 ENCOUNTER — DOCUMENTATION ONLY (OUTPATIENT)
Dept: SLEEP MEDICINE | Facility: CLINIC | Age: 62
End: 2017-06-26

## 2017-06-28 ENCOUNTER — OFFICE VISIT (OUTPATIENT)
Dept: PSYCHIATRY | Facility: CLINIC | Age: 62
End: 2017-06-28
Payer: COMMERCIAL

## 2017-06-28 VITALS
WEIGHT: 239.4 LBS | SYSTOLIC BLOOD PRESSURE: 113 MMHG | HEART RATE: 69 BPM | DIASTOLIC BLOOD PRESSURE: 80 MMHG | BODY MASS INDEX: 33.39 KG/M2 | RESPIRATION RATE: 18 BRPM

## 2017-06-28 DIAGNOSIS — F34.0 CYCLOTHYMIA: Primary | ICD-10-CM

## 2017-06-28 PROCEDURE — 99214 OFFICE O/P EST MOD 30 MIN: CPT | Performed by: CLINICAL NURSE SPECIALIST

## 2017-06-28 RX ORDER — LAMOTRIGINE 25 MG/1
TABLET ORAL
Qty: 100 TABLET | Refills: 0 | Status: SHIPPED | OUTPATIENT
Start: 2017-06-28 | End: 2017-08-18

## 2017-06-28 ASSESSMENT — ANXIETY QUESTIONNAIRES
7. FEELING AFRAID AS IF SOMETHING AWFUL MIGHT HAPPEN: MORE THAN HALF THE DAYS
3. WORRYING TOO MUCH ABOUT DIFFERENT THINGS: MORE THAN HALF THE DAYS
1. FEELING NERVOUS, ANXIOUS, OR ON EDGE: MORE THAN HALF THE DAYS
5. BEING SO RESTLESS THAT IT IS HARD TO SIT STILL: MORE THAN HALF THE DAYS
2. NOT BEING ABLE TO STOP OR CONTROL WORRYING: MORE THAN HALF THE DAYS
GAD7 TOTAL SCORE: 16
IF YOU CHECKED OFF ANY PROBLEMS ON THIS QUESTIONNAIRE, HOW DIFFICULT HAVE THESE PROBLEMS MADE IT FOR YOU TO DO YOUR WORK, TAKE CARE OF THINGS AT HOME, OR GET ALONG WITH OTHER PEOPLE: EXTREMELY DIFFICULT
6. BECOMING EASILY ANNOYED OR IRRITABLE: NEARLY EVERY DAY

## 2017-06-28 ASSESSMENT — PATIENT HEALTH QUESTIONNAIRE - PHQ9: 5. POOR APPETITE OR OVEREATING: NEARLY EVERY DAY

## 2017-06-28 NOTE — MR AVS SNAPSHOT
After Visit Summary   6/28/2017    Russell Miller    MRN: [id number]           Patient Information     Date Of Birth          1955        Visit Information        Provider Department      6/28/2017 9:15 AM Annelise Aguilar APRN CNS [facility]        Today's Diagnoses     Cyclothymia    -  1      Care Instructions    Treatment Plan:  Continue bupropion (Wellbutrin)  mg daily.     Begin lamotrigine (Lamictal) 25 mg Take one tab for two weeks, then two tabs for two weeks, then four tabs daily.     Schedule individual therapy.     Follow up in 5-6 weeks.     - Recommend patient discuss medications with their pharmacist. Risks and benefits for medications were discussed including, but not limited to, side effects.   - Safety plan was reviewed; to the ER as needed or call after hours crisis line; [phone]  - Education and counseling was done regarding use of medications, psychotherapy options  - Call [phone] for appointment or to speak to a nurse.    -Office hours: Monday through Thursday 8:00 am to 4:30 pm; Friday 8:00 am to Noon.   - Patient received a copy of this Treatment Plan today.            Follow-ups after your visit        Your next 10 appointments already scheduled     Jul 21, 2017  9:00 AM CDT   New Sleep Patient with Francisco Carvalho PsyD   [facility] ([facility])    [address] [place] [address] [id number] [phone]            Jul 28, 2017  9:00 AM CDT   Return Sleep Patient with RICKIE Patel   [facility] ([facility])    [address] [place] [id number] [phone]              Who to contact     If you have questions or need follow up information about today's clinic visit or your schedule please contact [facility] directly at [phone].  Normal or non-critical lab and imaging  "results will be communicated to you by MyChart, letter or phone within 4 business days after the clinic has received the results. If you do not hear from us within 7 days, please contact the clinic through dooyoot or phone. If you have a critical or abnormal lab result, we will notify you by phone as soon as possible.  Submit refill requests through [employer] or call your pharmacy and they will forward the refill request to us. Please allow 3 business days for your refill to be completed.          Additional Information About Your Visit        Play for [employer] Information     [employer] lets you send messages to your doctor, view your test results, renew your prescriptions, schedule appointments and more. To sign up, go to [email or web address]/[employer] . Click on \"Log in\" on the left side of the screen, which will take you to the Welcome page. Then click on \"Sign up Now\" on the right side of the page.     You will be asked to enter the access code listed below, as well as some personal information. Please follow the directions to create your username and password.     Your access code is: [email or web address]  Expires: 7/3/2017  5:00 PM     Your access code will  in 90 days. If you need help or a new code, please call your [place] clinic or [phone].        Care EveryWhere ID     This is your Care EveryWhere ID. This could be used by other organizations to access your [place] medical records  [id number]        Your Vitals Were     Pulse Respirations BMI (Body Mass Index)             69 18 33.39 kg/m2          Blood Pressure from Last 3 Encounters:   17 113/80   17 113/82   17 99/76    Weight from Last 3 Encounters:   17 239 lb 6.4 oz (108.6 kg)   17 241 lb (109.3 kg)   17 242 lb (109.8 kg)              Today, you had the following     No orders found for display         Today's Medication Changes          These changes are accurate as of: 17  9:33 AM.  If you have any questions, ask your " nurse or doctor.               Start taking these medicines.        Dose/Directions    lamoTRIgine 25 MG tablet   Commonly known as:  LaMICtal   Used for:  Cyclothymia   Started by:  Annelise Aguilar APRN CNS        Take one tab for two weeks, then two tabs for two weeks, then four tabs daily.   Quantity:  100 tablet   Refills:  0            Where to get your medicines      These medications were sent to [facility] [address]  [address] [address]     Phone:  [phone]     lamoTRIgine 25 MG tablet                Primary Care Provider Office Phone # Fax #    Sharad Caroline Maya PA-C [phone]       [facility] [address]  [place] [address]        Equal Access to Services     [facility]: Hadii aad ku hadasho Soomaali, waaxda luqadaha, qaybta kaalmada adeegyada, waxay eloin hayclaribel garibay . So [facility] [phone].    ATENCIÓN: Si habla español, tiene a hartmann disposición servicios gratuitos de asistencia lingüística. Noah al [phone].    We comply with applicable federal civil rights laws and [place] laws. We do not discriminate on the basis of race, color, national origin, age, disability sex, sexual orientation or gender identity.            Thank you!     Thank you for choosing [facility]  for your care. Our goal is always to provide you with excellent care. Hearing back from our patients is one way we can continue to improve our services. Please take a few minutes to complete the written survey that you may receive in the mail after your visit with us. Thank you!             Your Updated Medication List - Protect others around you: Learn how to safely use, store and throw away your medicines at www.disposemymeds.org.          This list is accurate as of: 6/28/17  9:33 AM.  Always use your most recent med list.                   Brand Name Dispense Instructions for use Diagnosis     aspirin 81 MG tablet     30 tablet    Take 1 tablet (81 mg) by mouth daily        buPROPion 300 MG 24 hr tablet    WELLBUTRIN XL    90 tablet    Take 1 tablet (300 mg) by mouth every morning    Moderate single current episode of major depressive disorder (H)       carvedilol 12.5 MG tablet    COREG    180 tablet    Take 1 tablet (12.5 mg) by mouth 2 times daily (with meals)    Essential hypertension       cetirizine 10 MG tablet    zyrTEC     Take 10 mg by mouth daily        lamoTRIgine 25 MG tablet    LaMICtal    100 tablet    Take one tab for two weeks, then two tabs for two weeks, then four tabs daily.    Cyclothymia       lisinopril-hydrochlorothiazide 10-12.5 MG per tablet    PRINZIDE/ZESTORETIC    90 tablet    Take 1 tablet by mouth daily    Essential hypertension       nitroGLYcerin 0.4 MG sublingual tablet    NITROSTAT    25 tablet    Place 1 tablet (0.4 mg) under the tongue every 5 minutes as needed for chest pain If still having symptoms after 3 doses call 911.    Atypical chest pain       order for DME      Equipment ordered: RESMED Auto PAP Mask type: Full face  Settings: 6-12 cm        polyethylene glycol powder    MIRALAX    850 g    Take 17 g (1 capful) by mouth daily    Chronic idiopathic constipation

## 2017-06-28 NOTE — PROGRESS NOTES
"      Psychiatric Progress Note    Name: Russell Miller  Date: 6/28/2017  Length of Visit: 30 minutes  MRN: [id number]      Current Outpatient Prescriptions   Medication Sig     carvedilol (COREG) 12.5 MG tablet Take 1 tablet (12.5 mg) by mouth 2 times daily (with meals)     lisinopril-hydrochlorothiazide (PRINZIDE/ZESTORETIC) 10-12.5 MG per tablet Take 1 tablet by mouth daily     buPROPion (WELLBUTRIN XL) 300 MG 24 hr tablet Take 1 tablet (300 mg) by mouth every morning     polyethylene glycol (MIRALAX) powder Take 17 g (1 capful) by mouth daily     cetirizine (ZYRTEC) 10 MG tablet Take 10 mg by mouth daily     aspirin 81 MG tablet Take 1 tablet (81 mg) by mouth daily     order for DME Equipment ordered: RESMED Auto PAP Mask type: Full face  Settings: 6-12 cm     nitroglycerin (NITROSTAT) 0.4 MG SL tablet Place 1 tablet (0.4 mg) under the tongue every 5 minutes as needed for chest pain If still having symptoms after 3 doses call 911. (Patient not taking: Reported on 6/28/2017)     No current facility-administered medications for this visit.        Therapist:  None    PHQ-9:  PHQ-9 score:    PHQ-9 SCORE 5/26/2017   Total Score 11       MARGOT-7:  MARGOT-7 SCORE 9/26/2016 4/11/2017 5/26/2017   Total Score 21 13 11           Interim History:  Patient returns for follow up from appointment 5-. Bupropion (Wellbutrin)  mg was continued. Patient was in the process of evaluation for NEHA through Sleep Medicine.     Today, patient reports it is easier to get out for appointments, rather than canceling. Patient reports meeting with care coordination and will he start seeing a therapist at [facility]. Patient reports adherence with CPAP, although he is taking it off, but is improving. Patient states taking zolpidem (Ambien) that he had from a previous prescription. Patient reports the bupropion (Wellbutrin) is \"not working any more\". Patient reports the bupropion (Wellbutrin) \"lifted me up\", but it's not as effective now. " "Patient reports feeling more depressed, yet states he has been laughing more and \"woke up in a really good mood this morning\". Patient reports mood swings which come on very quickly and are usually have high levels irritability. Patient states this is one reason that he isolates which is to avoid being \"mean to people\".       Past Medical History:   Diagnosis Date     Mitral valve insufficiency     Severe, resolved on echo 2016     PUD (peptic ulcer disease)        10 point ROS is negative except for those listed above.     Vital Signs:   /80  Pulse 69  Resp 18  Wt 239 lb 6.4 oz (108.6 kg)  BMI 33.39 kg/m2      Mental Status Assessment:  Appearance:  Well groomed      Behavior/relationship to examiner/demeanor:  Cooperative, engaged and pleasant  Motor activity:  Normal  Gait:  Normal   Speech:  Normal in volume, articulation, coherence   Mood (subjective report):  \"I'm laughing more\"  Affect (objective appearance):  Mood congruent  Thought Process (Associations):  Logical, linear and goal directed  Thought content:  No evidence of suicidal or homicidal ideation,          no overt psychosis and                    patient does not appear to be responding to internal stimuli  Oriented to person, place, date/time   Attention Span and concentration: Intact   Memory:  Short-term memory intact and Long-term memory; Intact  Language:  Fluent   Fund of Knowledge/Intelligence:  Average  Use of language: Intact   Abstraction:  Normal  Insight:  Adequate  Judgment:  Adequate for safety    DSM DIAGNOSIS:  Attention-Deficit/Hyperactivity Disorder  314.01 (F90.2) Combined presentation - patient reported.  296.32 Major Depressive Disorder, Recurrent Episode, Moderate _ and With anxious distress  300.02 (F41.1) Generalized Anxiety Disorder    Assessment:  Patient is experiencing rapid mood cycling with high irritability, so a mood stabilizer is indicated. Patient initially reported his mood is not improved; however, with " further discussion, patient agrees he is feeling better.     Medication side effects and alternatives were reviewed.     Treatment Plan:  Continue bupropion (Wellbutrin)  mg daily.     Begin lamotrigine (Lamictal) 25 mg Take one tab for two weeks, then two tabs for two weeks, then four tabs daily.     Schedule individual therapy.     Follow up in 5-6 weeks.     - Recommend patient discuss medications with their pharmacist. Risks and benefits for medications were discussed including, but not limited to, side effects.   - Safety plan was reviewed; to the ER as needed or call after hours crisis line; [phone]  - Education and counseling was done regarding use of medications, psychotherapy options  - Call [phone] for appointment or to speak to a nurse.    -Office hours: Monday through Thursday 8:00 am to 4:30 pm; Friday 8:00 am to Noon.   - Patient received a copy of this Treatment Plan today.    Patient will continue to be seen for ongoing consultation and stabilization.      Signed:  Annelise Aguilar, RN, MS, CNS-BC

## 2017-06-28 NOTE — PATIENT INSTRUCTIONS
Treatment Plan:  Continue bupropion (Wellbutrin)  mg daily.     Begin lamotrigine (Lamictal) 25 mg Take one tab for two weeks, then two tabs for two weeks, then four tabs daily.     Schedule individual therapy.     Follow up in 5-6 weeks.     - Recommend patient discuss medications with their pharmacist. Risks and benefits for medications were discussed including, but not limited to, side effects.   - Safety plan was reviewed; to the ER as needed or call after hours crisis line; [phone]  - Education and counseling was done regarding use of medications, psychotherapy options  - Call [phone] for appointment or to speak to a nurse.    -Office hours: Monday through Thursday 8:00 am to 4:30 pm; Friday 8:00 am to Noon.   - Patient received a copy of this Treatment Plan today.

## 2017-06-28 NOTE — NURSING NOTE
"Chief Complaint   Patient presents with     Recheck Medication     Patient states that the medication wellbutrin is not working any longer       Initial Resp 18  Wt 239 lb 6.4 oz (108.6 kg)  BMI 33.39 kg/m2 Estimated body mass index is 33.39 kg/(m^2) as calculated from the following:    Height as of 6/13/17: 5' 11\" (1.803 m).    Weight as of this encounter: 239 lb 6.4 oz (108.6 kg).  Medication Reconciliation: complete    "

## 2017-06-29 ASSESSMENT — ANXIETY QUESTIONNAIRES: GAD7 TOTAL SCORE: 16

## 2017-06-29 ASSESSMENT — PATIENT HEALTH QUESTIONNAIRE - PHQ9: SUM OF ALL RESPONSES TO PHQ QUESTIONS 1-9: 14

## 2017-07-05 ENCOUNTER — CARE COORDINATION (OUTPATIENT)
Dept: CARE COORDINATION | Facility: CLINIC | Age: 62
End: 2017-07-05

## 2017-07-05 NOTE — PROGRESS NOTES
Clinic Care Coordination Contact  Social Work Outreach    Pt called SW for assistance with resources. Pt was previously referred for individual therapy through [facility] during last visit. Pt reports that he was denied an appointment due to insurance. Pt is now asking for help getting into IP Tx.     SW will see pt in clinic 7/6@9:30am to discuss options.    ROMAN Martell  Care Coordination  [place]  [phone]  [email or web address]

## 2017-07-06 NOTE — PROGRESS NOTES
Clinic Care Coordination Contact  Care Team Conversations    CHERELLE met with pt in clinic to discuss provider referrals. Pt reported that he had been told that [facility] did not currently have a provider available to see him. Pt reports feeling depressed and has constant suicidal thoughts. Pt requested assistance with getting into residential treatment.    Pt stated he has developed intimidating facial expressions as a defense mechanism starting in childhood. Pt is aware of various times during his adult life that he was told his expression changes have made others feel unsafe around him.    CHERELLE reviewed pt insurance options and reviewed possible programs based on insurance. Pt chose [facility] and expressed familiarity with their program. CHERELLE spoke with [facility] in assessments and scheduled pt for July 17, 2017 @ 9:30am.     CHERELLE educated pt on assessment process and reviewed current safety plan with pt. Pt understands he will contact family life to join their senior transitions group until he is able to attend assessment. CHERELLE provided both the [facility] Crisis and [place] Line for the Elderly to assist with emotional support in the moment.    CHERELLE will outreach to pt prior to assessment. Pt is able to contact CHERELLE if needed before next outreach.    ROMAN Martell  Care Coordination  [place]  [phone]  [email or web address]

## 2017-07-10 ENCOUNTER — DOCUMENTATION ONLY (OUTPATIENT)
Dept: SLEEP MEDICINE | Facility: CLINIC | Age: 62
End: 2017-07-10

## 2017-07-10 NOTE — PROGRESS NOTES
14 DAY [facility] VISIT    Message left for patient to return call    Assessment: Pt not meeting objective benchmarks for compliance     Action plan: Waiting for patient to return call.  and Pt to have 30 day STM visit.   Device settings:    EPAP Min Auto CPAP: 6.0 (The minimum allowable pressure in cmH2O)    EPAP Max Auto CPAP: 12.0 (The maximum allowable pressure in cmH2O)    Target IPAP (95% of Target) 14 day average (Resmed): 11.2cm H20      Objective measures: 14 day rolling measure     % compliance greater than four hours rolling average 14 days: 14.2%     95% OF Leak in litres Rolling Average 14 Days: 16.24 lpm last data upload        AHI Rolling Average 14 Day: 4.62  last data upload        Time mask on face 14 day average: 185 min         Objective measure goal  Compliance   Goal >70%  Leak   Goal < 10%  AHI  Goal < 5  Usage  Goal >240

## 2017-07-10 NOTE — PROGRESS NOTES
Patient returned call.     Subjective measures: Pt returned my call and states that he's struggling with CPAP.  He finds the mask uncomfortable and isn't able to sleep long periods with it.  He also feels like the pressure is too high.  He is currently using a FFM and would like to try a nasal mask. Patient failing following subjective benchmarks: pressure issues and mask discomfort    Action plan:Scheduled mask fit appointment and Pt to have 30 day STM visit.

## 2017-07-11 ENCOUNTER — DOCUMENTATION ONLY (OUTPATIENT)
Dept: SLEEP MEDICINE | Facility: CLINIC | Age: 62
End: 2017-07-11

## 2017-07-11 DIAGNOSIS — G25.81 RESTLESS LEGS SYNDROME (RLS): ICD-10-CM

## 2017-07-11 DIAGNOSIS — G47.33 OSA (OBSTRUCTIVE SLEEP APNEA): ICD-10-CM

## 2017-07-11 DIAGNOSIS — F51.04 PSYCHOPHYSIOLOGICAL INSOMNIA: ICD-10-CM

## 2017-07-11 NOTE — PROGRESS NOTES
Patient came to [facility] for mask fitting appointment on July 11, 2017. Patient requested to switch masks because he is struggling to use his current full face mask.  Patient tried on the followings masks: Airfit N20. Patient selected  Resmed, type Airfit [id number] mask Medium.

## 2017-07-17 DIAGNOSIS — F51.04 PSYCHOPHYSIOLOGICAL INSOMNIA: ICD-10-CM

## 2017-07-17 NOTE — TELEPHONE ENCOUNTER
Left message on voice mail for patient to call clinic. [phone]  Med was for sleep study.  Previous RX for Ambien had been stopped.

## 2017-07-17 NOTE — TELEPHONE ENCOUNTER
Ambien      Last Written Prescription Date:  03/28/17  Last Fill Quantity: 1,   # refills: 0  Last Office Visit with [facility], [facility] or [facility] prescribing provider: 07/17/17  Future Office visit:    Next 5 appointments (look out 90 days)     Jul 17, 2017  2:20 PM CDT   SHORT with Frnacine Maddox NP   [facility] [place] [facility]    [address]  [place] [address]   [phone]                   Routing refill request to provider for review/approval because:  Drug not on the [facility], [facility] or [facility] refill protocol or controlled substance

## 2017-07-18 RX ORDER — ZOLPIDEM TARTRATE 10 MG/1
TABLET ORAL
Qty: 1 TABLET | Refills: 0 | Status: CANCELLED | OUTPATIENT
Start: 2017-07-18

## 2017-07-18 NOTE — TELEPHONE ENCOUNTER
According to chart med was stopped and stated not effective.  Patient is requesting.          zolpidem (AMBIEN) 10 MG tablet (Discontinued) 30 tablet 0 3/27/2017 4/4/2017 No      Sig: Take 0.5-1 tablets (5-10 mg) by mouth nightly as needed for sleep     Class: Local Print     Route: Oral     Reason for Discontinue: Not Effective     Order: [id number]

## 2017-07-20 ENCOUNTER — DOCUMENTATION ONLY (OUTPATIENT)
Dept: SLEEP MEDICINE | Facility: CLINIC | Age: 62
End: 2017-07-20

## 2017-07-20 DIAGNOSIS — G47.33 OSA (OBSTRUCTIVE SLEEP APNEA): ICD-10-CM

## 2017-07-20 DIAGNOSIS — F51.04 PSYCHOPHYSIOLOGICAL INSOMNIA: ICD-10-CM

## 2017-07-20 DIAGNOSIS — G25.81 RESTLESS LEGS SYNDROME (RLS): ICD-10-CM

## 2017-07-20 NOTE — PROGRESS NOTES
Patient brought his Airsense 10 machine to the [facility]. Patient said his machine stopped working on him Saturday night. I turned on the machine had the patient put on this mask. The machine is working fine. The power cord also works. After a few seconds the screen on the machine dimmed out. Patient pointed to the screen saying it doesn't work. I told patient the screen will dim out after a few seconds but the machine is still running. Patient also wanted to go back to using a full face mask. I told the patient his insurance will only allow one every 3 months. Patient became frustrated and left.

## 2017-07-20 NOTE — Clinical Note
Sherry,  Please see notes. The patient seem to not understand his machine. He feels frustrated with CPAP therapy overall. Please reach out to patient. I did remind him that he missed Monday's appt with you.

## 2017-07-21 ENCOUNTER — OFFICE VISIT (OUTPATIENT)
Dept: SLEEP MEDICINE | Facility: CLINIC | Age: 62
End: 2017-07-21
Attending: PHYSICIAN ASSISTANT
Payer: COMMERCIAL

## 2017-07-21 VITALS
HEIGHT: 71 IN | DIASTOLIC BLOOD PRESSURE: 80 MMHG | WEIGHT: 236 LBS | OXYGEN SATURATION: 99 % | HEART RATE: 71 BPM | BODY MASS INDEX: 33.04 KG/M2 | SYSTOLIC BLOOD PRESSURE: 111 MMHG

## 2017-07-21 DIAGNOSIS — G47.21 SLEEP DISORDER, CIRCADIAN, DELAYED SLEEP PHASE TYPE: Primary | ICD-10-CM

## 2017-07-21 DIAGNOSIS — G47.33 OSA (OBSTRUCTIVE SLEEP APNEA): ICD-10-CM

## 2017-07-21 DIAGNOSIS — F51.04 PSYCHOPHYSIOLOGICAL INSOMNIA: ICD-10-CM

## 2017-07-21 PROCEDURE — 90791 PSYCH DIAGNOSTIC EVALUATION: CPT | Performed by: PSYCHOLOGIST

## 2017-07-21 ASSESSMENT — ANXIETY QUESTIONNAIRES
6. BECOMING EASILY ANNOYED OR IRRITABLE: NEARLY EVERY DAY
2. NOT BEING ABLE TO STOP OR CONTROL WORRYING: NEARLY EVERY DAY
1. FEELING NERVOUS, ANXIOUS, OR ON EDGE: NEARLY EVERY DAY
GAD7 TOTAL SCORE: 21
3. WORRYING TOO MUCH ABOUT DIFFERENT THINGS: NEARLY EVERY DAY
5. BEING SO RESTLESS THAT IT IS HARD TO SIT STILL: NEARLY EVERY DAY
7. FEELING AFRAID AS IF SOMETHING AWFUL MIGHT HAPPEN: NEARLY EVERY DAY

## 2017-07-21 ASSESSMENT — PATIENT HEALTH QUESTIONNAIRE - PHQ9: 5. POOR APPETITE OR OVEREATING: NEARLY EVERY DAY

## 2017-07-21 NOTE — NURSING NOTE
"Chief Complaint   Patient presents with     Sleep Problem     consult-Insomnia       Initial /80  Pulse 71  Ht 1.803 m (5' 11\")  Wt 107 kg (236 lb)  SpO2 99%  BMI 32.92 kg/m2 Estimated body mass index is 32.92 kg/(m^2) as calculated from the following:    Height as of this encounter: 1.803 m (5' 11\").    Weight as of this encounter: 107 kg (236 lb).  Medication Reconciliation: complete    "

## 2017-07-21 NOTE — PROGRESS NOTES
SLEEP MEDICINE CONSULTATION  Sleep Psychology    Name: Russell Miller MRN# [id number]   Age: 62 year old YOB: 1955     Date of Consultation: Jul 21, 2017  Consultation is requested by: RICKIE Patel  [facility]  [address]  [place] [address]  Primary care provider: Sharad Maya    Reason for Sleep Consultation     Russell Miller is a 62 year old male who seen today for a sleep consultation because of insomnia associated with NEHA.      Assessment and Plan     Summary Sleep Diagnoses:    1. Sleep disorder, circadian, delayed sleep phase type    2. NEHA (obstructive sleep apnea)    3. Psychophysiological insomnia        Summary Recommendations:      Russell Miller was seen for treatment of insomnia.  Insomnia is multifactorial and with history suggesting prominent delayed sleep phase disorder.  Insomnia and sleep habits in part reflect patient attempting to sleep during a time when he is not physiologically ready for sleep.  Depression and anxiety may also be contributors to poor quality sleep along with moderate sleep apnea currently untreated.    We will establish a sleep window consistent with delayed sleep schedule and compress schedule to conform with estimated 7 hour sleep need.  Sleep schedule between 3 AM- 11 AM established.  Patient will avoid time in bed awake and go to bed when ready for sleep as early as 3 AM but waiting until sleepy if needed. He will continue to use room darkening shades to eliminate morning light into bed space.    Patient is reporting elevated symptoms of depression and will followup with his psychiatrist.  With patient reporting suicidal ideation, assessment today indicates patient is experiencing passive thoughts and adamantly denied any intention or plan to harm self and will reach out to his psychiatry or emercency services if he has impulse to harm self.    Followup with JAROD Quinonez for NEHA treatment.  I think treatment will be more  "successful if we can improve sleep consolidation and align his sleep schedule to fit with circadian sleep phase tendency.    Summary Counseling:      Russell was provided information about the pathophysiology of insomnia and psychophysiological factors contributing to the onset and maintenance of insomnia associated with delayed sleep phase and NEHA.  Treatment option were discussed including component of cognitive-behavioral therapy for insomnia. The benefits and potential early side effects of treatment including increased daytime sleepiness were discussed. Patient was counseled on the importance of avoiding driving if drowsy.    See patient instructions for initial treatment recommendations and behavioral sleep plan.    Follow-up: 3 weeks conjoint with sleep medicine medical followup with JAROD Quinonez here in [facility]     History of Present Sleep Concerns     Russell Miller is a 62 year old year old male who reports a history of difficulty initiating sleep since adolescence.  He describes himself as a \"chronic night owl.\" He reports he rarely achieved sleep onset before 3 am and has tried to use alcohol and marijuana to wind down to sleep earlier.  He feels he is made this way and feels some of his friends have called him \"lazy\" for chronically sleeping late into the morning, sometimes til noon.  He does not currently take medication for his insomnia but has used alcohol and marijuana to attempt to fall asleep earlier.  Over the years he come to use marijuana daily usually early evening.    He reports chronic depression and anxiety and currently is taking bupropion take when he wakes up for the morning.  He is reported severe symptoms of depressionn and follows with his psychiatrist Annelise Barbosa closely having seen her three weeks ago and today is reporting passive suicidal thoughts which he says are chronic.  He has no plan or intention to harm self and agreed to plan for safety that involves " "contact his psychiatrist or emergency services if he feels an impulse or active thoughts of harming himself.    Russell usually goes to bed at about 11 PM 'always hoping he will fall asleep but it never happens..\"  He will read, watch TV and try to relax enough to fall asleep but usually does not do so until about 4 AM.  He will then sleep until 10 AM to noon.  He awakens 2-4 times a night spontaneously, sometimes anxious or because of breathing related stress.    He has tried CPAP but finds the mask hard to wear.  He also applies the mask at about 11 PM and is alert for the next several hours making it hard to tolerate given low sleep propensity at that time.  He is agreeable to following up with my colleage Orlando OLIVERA to explore treatment options and mask fit.    Past Sleep Studies/Consultation:    Patient: Russell Miller  YOB: 1955  Study Date: 6/14/2017  MRN: [id number]  Referring Provider: Annelise Aguilar MD  Ordering Provider: Orlando Nicole PA-C    Indications for Polysomnography: The patient is a 62 y old Male   who is 5' 11\" and weighs 253.0 lbs.  His BMI is 35.4, [place]   sleepiness scale is 5.0 and neck size is 45.0.  Relevant medical   history includes HTN, polysubstance abuse, and prior non-ischemic   cardiomyopathy with near-normal EF in 2016.  A diagnostic   polysomnogram PAP titration was performed for NEHA.    Polysomnogram Data:  A full night polysomnogram recorded the   standard physiologic parameters including EEG, EOG, EMG, ECG,   nasal and oral airflow.  Respiratory parameters of chest and   abdominal movements were recorded with respiratory inductance   plethysmography.  Oxygen saturation was recorded by pulse   oximetry.      Treatment PSG:  Sleep Architecture: Increased sleep latency, fragmented sleep,   poor sleep efficiency, and absence of both N3 REM sleep despite   sleep aid.  The total recording time of the study was 364.9 minutes.  The   total sleep time was 220.5 minutes. "  Sleep latency was increased   at 47.7 minutes with the use of a sleep aid.  REM latency was -   minutes.  Arousal index was increased at 61.0 arousals per hour.    Sleep efficiency was decreased at 60.4%.  Wake after sleep onset   was 95.5 minutes.   The patient spent 64.4% of total sleep time   in Stage N1, 35.6% in Stage N2, 0.0% in Stage N3 and 0.0% in REM.       Respiration: Unacceptable titration.  ? The patient was titrated at pressures ranging from 5 cmH2O up   to 15 cmH2O.  The optimal pressure achieved was 15 cmH2O with a   residual AHI of 20.9 events per hour.  No time in REM.   ? This titration was considered: unacceptable (does not meet any   of the above criteria).  ? Respiratory rate and pattern - was normal.  ? Sustained Sleep Associated Hypoventilation - Transcutaneous   carbon dioxide monitoring was not used; however significant   hypoventilation was not suggested by oximetry.  ? Sleep Associated Hypoxemia - (Greater than 5 minutes O2 sat   below 89%) was not present.  Baseline oxygen saturation was   94.5%. Lowest oxygen saturation was 83.9%.  Time spent less than   or equal to 88% was 0.1 minutes.  Time spent less than or equal   to 89% was 0.2 minutes.    Movement Activity: Elevated PLM index with associated arousals.  ? Periodic Limb Activity - There were 153 PLMs during the entire   study. The PLM index was 41.6 movements per hour.  The PLM   Arousal Index was 10.3 per hour.  ? REM EMG Activity - Excessive muscle activity was not present.  ? Nocturnal Behavior - Abnormal sleep related behaviors were not   noted.  ? Bruxism - None apparent.    Cardiac Summary: Summary assessment  The average pulse rate was 61.2 bpm.  The minimum pulse rate was   47.1 bpm while the maximum pulse rate was 81.0 bpm. The rhythm is   normal sinus. Arrhythmias were not noted.    Assessment:   ? Increased sleep latency, fragmented sleep, poor sleep   efficiency, and absence of both N3 REM sleep despite sleep aid.  ?  Frequent post-arousal hypopneas.    ? Unacceptable titration with full facemask.  Consider   alternative mask interface.  ? Elevated PLM index with associated arousals.    Recommendations:  ? Treatment of NEHA with Auto-titrating PAP therapy with a range   of 6 - 12 cmH2O.  Recommend clinical follow up with sleep   management team, including review of compliance measures.  ? Advise regarding the risks of drowsy driving.  ? Suggest optimizing sleep schedule and avoiding sleep   deprivation.  ? Weight management (if BMI > 30).  ? Pharmacologic therapy should be used for management of restless   legs syndrome only if present and clinically indicated and not   based on the presence of periodic limb movements alone.    Cardiac Comments: Normal Sinus Rhythm  Diagnostic Codes:      Obstructive Sleep Apnea G47.33    Unspecified Sleep Disturbance G47.9           Sleep Habits and Concerns:    Russell does use electronics in bed, watch TV in bed, worry in bed about life and read in bed. The patient  does not watch the clock at night.    Russell  does report worry about insomnia, concern about next day functioning, and feels he has lost control of his ability to sleep.  Russell  does report awakening feeling anxious, tense or worried. There does appear to be significant effort spent trying to sleep.    Sleep Environment:    Russell reports his bedroom is quiet, comfortable and dark. The patient does not have a regular bed partner and he  does not have pets in the bedroom.      Other subjective complaints:    Russell does not report pain or discomfort at night. There is not awakening due to heart pounding or racing. Russell does not report night time GERD symptoms.         Substance Use:    Tobacco: none    Caffeine:  Uses 1-2 cups/day of coffee. Last caffeine intake is usually before 7 pm.   Supplements for wakefulness: none   Alcohol: occasional   Recreational Drugs: daily marijuana use.       Screening         Sleepiness:  [place] Sleepiness  "Scale score -  3/24 .  .    Insomnia:  Insomnia Severity Index -  20/28    Depression:       PHQ-9 SCORE 7/21/2017   Total Score 24       Anxiety:      MARGOT-7 SCORE 5/26/2017 6/28/2017 7/21/2017   Total Score 11 16 21         Vitals     /80  Pulse 71  Ht 1.803 m (5' 11\")  Wt 107 kg (236 lb)  SpO2 99%  BMI 32.92 kg/m2     Medical History     Past Medical History:   Diagnosis Date     Mitral valve insufficiency     Severe, resolved on echo 2016     PUD (peptic ulcer disease)        Current Outpatient Prescriptions   Medication Sig Dispense Refill     cetirizine (ZYRTEC) 10 MG tablet Take 1 tablet (10 mg) by mouth daily 30 tablet 3     lamoTRIgine (LAMICTAL) 25 MG tablet Take one tab for two weeks, then two tabs for two weeks, then four tabs daily. 100 tablet 0     order for DME Equipment ordered: RESMED Auto PAP Mask type: Full face  Settings: 6-12 cm       carvedilol (COREG) 12.5 MG tablet Take 1 tablet (12.5 mg) by mouth 2 times daily (with meals) 180 tablet 1     lisinopril-hydrochlorothiazide (PRINZIDE/ZESTORETIC) 10-12.5 MG per tablet Take 1 tablet by mouth daily 90 tablet 1     buPROPion (WELLBUTRIN XL) 300 MG 24 hr tablet Take 1 tablet (300 mg) by mouth every morning 90 tablet 1     polyethylene glycol (MIRALAX) powder Take 17 g (1 capful) by mouth daily 850 g 6     aspirin 81 MG tablet Take 1 tablet (81 mg) by mouth daily 30 tablet      nitroglycerin (NITROSTAT) 0.4 MG SL tablet Place 1 tablet (0.4 mg) under the tongue every 5 minutes as needed for chest pain If still having symptoms after 3 doses call 911. 25 tablet 0       Past Surgical History:   Procedure Laterality Date     BACK SURGERY  11/5/2014    laminectomy     COLONOSCOPY WITH CO2 INSUFFLATION N/A 3/8/2016    Procedure: COLONOSCOPY WITH CO2 INSUFFLATION;  Surgeon: Karsten Allred MD;  Location: [facility] OR     ENDOSCOPY UPPER, COLONOSCOPY, COMBINED N/A 3/8/2016    Procedure: COMBINED ENDOSCOPY UPPER, COLONOSCOPY;  Surgeon: Karsten Allred" "MD Kyler;  Location: [facility] OR     ESOPHAGOSCOPY, GASTROSCOPY, DUODENOSCOPY (EGD), COMBINED N/A 3/8/2016    Procedure: COMBINED ESOPHAGOSCOPY, GASTROSCOPY, DUODENOSCOPY (EGD), BIOPSY SINGLE OR MULTIPLE;  Surgeon: Karsten Allred MD;  Location: [facility] OR     KNEE SURGERY Right 2013     SHOULDER SURGERY Left 9/14/2012    arthroscopic        No Known Allergies      Psychiatric History     Prior Psychiatric Diagnoses: yes, Major depressive disorder, generalized anxiety   Psychiatric Hospitalizations: none   Use of Psychotropics yes, bupropion       Chemical Use     Prior Chemical Dependency Treatment: No treatment but reports daily marijuana use and likely dependence.         Family History     Family History   Problem Relation Age of Onset     Heart Failure Mother      Hypertension Mother      CEREBROVASCULAR DISEASE Mother      \"numerous\"     Heart Failure Father      Hypertension Father      Myocardial Infarction Father 53     CABG x 2     DIABETES No family hx of      Hyperlipidemia No family hx of      Other Cancer No family hx of      Prostate Cancer No family hx of      Colon Cancer No family hx of        Sleep Disorders: None reported.    Social History     Social History     Social History     Marital status:      Spouse name: N/A     Number of children: N/A     Years of education: N/A     Occupational History      Unemployed     Social History Main Topics     Smoking status: Former Smoker     Smokeless tobacco: Never Used     Alcohol use No      Comment: occasional beer     Drug use: Yes     Special: Marijuana      Comment: marijuana - occasional use     Sexual activity: Yes     Partners: Female     Other Topics Concern     Parent/Sibling W/ Cabg, Mi Or Angioplasty Before 65f 55m? Yes     MI/CABG x2 @ age 53     Social History Narrative       Retired , .     Mental Status Examination     Russell presented as appropriately dressed and groomed and was oriented X3 with speech language " intact.  The patient was cooperative throughout the evaluation with no signs of hallucinations, delusions, loosening of associations or other thought disturbance.  Mood was depressed.  Affect was congruent with mood. Insight and judgement we intact.  Memory was intact for recent and remote elements.  There was no report of suicidal ideation, intention or plan. Attention and concentration were within normal.      Time Spent:  45 minutes    Copy:   Sharad Maya PA  [facility]  [address]  [place] [address]    Francisco Carvalho PsyD, MADDI, [facility]  Certified, Behavioral Sleep Medicine  [facility]  [place] [facility] [place]

## 2017-07-21 NOTE — MR AVS SNAPSHOT
After Visit Summary   7/21/2017    Russell Miller    MRN: [id number]           Patient Information     Date Of Birth          1955        Visit Information        Provider Department      7/21/2017 9:00 AM Francisco Carvalho PsyD [facility]        Today's Diagnoses     NEHA (obstructive sleep apnea)        Psychophysiological insomnia        Restless legs syndrome (RLS)          Care Instructions    Thank  You for coming in today to discuss your insomnia.  A large part of your insomnia is actually a condition called Delayed Sleep PHase Syndrome or marked night owl tendency.  You have struggled over the years to try to fall asleep much earlier but your body clock is not set for this.  Part of sleeping better since you are not having to work at a specific time is to allow yourself to set a sleep schedule more consistent with your internal body clock.    We will start with establishing a SLEEP WINDOW between between 3 AM and 11 AM while you continue to use light blocking shades in your bedroom.    You will then avoid getting in bed at 11 PM and engaging in a range of non sleep related activities.  You will now go to bed when you feel sleepy.  This should help eliminate  Your frustration and worry about not sleeping now that you will be working to sleep at a time more in alignment with your internal rhythm.    Being consistent with this can have a significant positive effect on your mood.    I would like  You to followup with Orlando Nicole for review of your use of CPAP.  I think you will have a better time using CPAP if you use it with a consistent and well timed sleep schedule.  Before you were attempting to apply the CPAP when you were not at all sleepy.  Given your health history and after discussing with our sleep team, it appears that CPAP over oral appliance would be the preferred route if possible.    We discussed how to wear CPAP during the day for an hour to desensitize  you to your mask.    Followup with Orlando Nicole and me in a few weeks.          Your BMI is Body mass index is 32.92 kg/(m^2).  Weight management is a personal decision.  If you are interested in exploring weight loss strategies, the following discussion covers the approaches that may be successful. Body mass index (BMI) is one way to tell whether you are at a healthy weight, overweight, or obese. It measures your weight in relation to your height.  A BMI of 18.5 to 24.9 is in the healthy range. A person with a BMI of 25 to 29.9 is considered overweight, and someone with a BMI of 30 or greater is considered obese. More than two-thirds of American adults are considered overweight or obese.  Being overweight or obese increases the risk for further weight gain. Excess weight may lead to heart disease and diabetes.  Creating and following plans for healthy eating and physical activity may help you improve your health.  Weight control is part of healthy lifestyle and includes exercise, emotional health, and healthy eating habits. Careful eating habits lifelong are the mainstay of weight control. Though there are significant health benefits from weight loss, long-term weight loss with diet alone may be very difficult to achieve- studies show long-term success with dietary management in less than 10% of people. Attaining a healthy weight may be especially difficult to achieve in those with severe obesity. In some cases, medications, devices and surgical management might be considered.  What can you do?  If you are overweight or obese and are interested in methods for weight loss, you should discuss this with your provider.     Consider reducing daily calorie intake by 500 calories.     Keep a food journal.     Avoiding skipping meals, consider cutting portions instead.    Diet combined with exercise helps maintain muscle while optimizing fat loss. Strength training is particularly important for building and maintaining  muscle mass. Exercise helps reduce stress, increase energy, and improves fitness. Increasing exercise without diet control, however, may not burn enough calories to loose weight.       Start walking three days a week 10-20 minutes at a time    Work towards walking thirty minutes five days a week     Eventually, increase the speed of your walking for 1-2 minutes at time    In addition, we recommend that you review healthy lifestyles and methods for weight loss available through the National Institutes of Health patient information sites:  http://win.niddk.nih.gov/publications/index.htm    And look into health and wellness programs that may be available through your health insurance provider, employer, local community center, or [employer].    Weight management plan: Patient was referred to their PCP to discuss a diet and exercise plan.              Follow-ups after your visit        Your next 10 appointments already scheduled     Jul 25, 2017  2:00 PM CDT   Office Visit with Sharad Maya PA-C   [facility] ([facility]    [address] [place] [id number] [phone]           Bring a current list of meds and any records pertaining to this visit.  For Physicals, please bring immunization records and any forms needing to be filled out.  Please arrive 10 minutes early to complete paperwork.            Jul 28, 2017  9:00 AM CDT   Return Sleep Patient with RICKIE Patel   [facility] ([facility])    [address] [place] [id number] [phone]              Who to contact     If you have questions or need follow up information about today's clinic visit or your schedule please contact [facility] directly at [phone].  Normal or non-critical lab and imaging results will be communicated to you by MyChart, letter or phone within 4 business days after the clinic has  "received the results. If you do not hear from us within 7 days, please contact the clinic through [employer] or phone. If you have a critical or abnormal lab result, we will notify you by phone as soon as possible.  Submit refill requests through [employer] or call your pharmacy and they will forward the refill request to us. Please allow 3 business days for your refill to be completed.          Additional Information About Your Visit        Krillion[employer]Borderfree Information     [employer]ed lets you send messages to your doctor, view your test results, renew your prescriptions, schedule appointments and more. To sign up, go to [email or web address] . Click on \"Log in\" on the left side of the screen, which will take you to the Welcome page. Then click on \"Sign up Now\" on the right side of the page.     You will be asked to enter the access code listed below, as well as some personal information. Please follow the directions to create your username and password.     Your access code is: [email or web address]  Expires: 10/18/2017  8:07 AM     Your access code will  in 90 days. If you need help or a new code, please call your [facility] clinic or [phone].        Care EveryWhere ID     This is your Care EveryWhere ID. This could be used by other organizations to access your [facility] medical records  [id number]        Your Vitals Were     Pulse Height Pulse Oximetry BMI (Body Mass Index)          71 1.803 m (5' 11\") 99% 32.92 kg/m2         Blood Pressure from Last 3 Encounters:   17 111/80   17 113/80   17 113/82    Weight from Last 3 Encounters:   17 107 kg (236 lb)   17 108.6 kg (239 lb 6.4 oz)   17 109.3 kg (241 lb)              Today, you had the following     No orders found for display       Primary Care Provider Office Phone # Fax #    Sharad Maya PA-C [phone]       [facility] [place] [address] [place] [address]        Equal Access to Services     LOVE BAXTER AH: " Hadii juan david blankenship Soambrosioali, waaxda luqadaha, qaybta kaalmada bibi, konstantin eloin hayaacarrie khourydarin hayleelisa lairenecarrie eileen. So [facility] [phone].    ATENCIÓN: Si joanna oconnell, tiene a hartmann disposición servicios gratuitos de asistencia lingüística. Noah al [phone].    We comply with applicable federal civil rights laws and [place] laws. We do not discriminate on the basis of race, color, national origin, age, disability sex, sexual orientation or gender identity.            Thank you!     Thank you for choosing [facility]  for your care. Our goal is always to provide you with excellent care. Hearing back from our patients is one way we can continue to improve our services. Please take a few minutes to complete the written survey that you may receive in the mail after your visit with us. Thank you!             Your Updated Medication List - Protect others around you: Learn how to safely use, store and throw away your medicines at www.disposemymeds.org.          This list is accurate as of: 7/21/17  9:28 AM.  Always use your most recent med list.                   Brand Name Dispense Instructions for use Diagnosis    aspirin 81 MG tablet     30 tablet    Take 1 tablet (81 mg) by mouth daily        buPROPion 300 MG 24 hr tablet    WELLBUTRIN XL    90 tablet    Take 1 tablet (300 mg) by mouth every morning    Moderate single current episode of major depressive disorder (H)       carvedilol 12.5 MG tablet    COREG    180 tablet    Take 1 tablet (12.5 mg) by mouth 2 times daily (with meals)    Essential hypertension       cetirizine 10 MG tablet    zyrTEC    30 tablet    Take 1 tablet (10 mg) by mouth daily    Chronic seasonal allergic rhinitis, unspecified trigger       lamoTRIgine 25 MG tablet    LaMICtal    100 tablet    Take one tab for two weeks, then two tabs for two weeks, then four tabs daily.    Cyclothymia       lisinopril-hydrochlorothiazide 10-12.5 MG per tablet     PRINZIDE/ZESTORETIC    90 tablet    Take 1 tablet by mouth daily    Essential hypertension       nitroGLYcerin 0.4 MG sublingual tablet    NITROSTAT    25 tablet    Place 1 tablet (0.4 mg) under the tongue every 5 minutes as needed for chest pain If still having symptoms after 3 doses call 911.    Atypical chest pain       order for DME      Equipment ordered: RESMED Auto PAP Mask type: Full face  Settings: 6-12 cm        polyethylene glycol powder    MIRALAX    850 g    Take 17 g (1 capful) by mouth daily    Chronic idiopathic constipation

## 2017-07-21 NOTE — PATIENT INSTRUCTIONS
Thank  You for coming in today to discuss your insomnia.  A large part of your insomnia is actually a condition called Delayed Sleep PHase Syndrome or marked night owl tendency.  You have struggled over the years to try to fall asleep much earlier but your body clock is not set for this.  Part of sleeping better since you are not having to work at a specific time is to allow yourself to set a sleep schedule more consistent with your internal body clock.    We will start with establishing a SLEEP WINDOW between between 3 AM and 11 AM while you continue to use light blocking shades in your bedroom.    You will then avoid getting in bed at 11 PM and engaging in a range of non sleep related activities.  You will now go to bed when you feel sleepy.  This should help eliminate  Your frustration and worry about not sleeping now that you will be working to sleep at a time more in alignment with your internal rhythm.    Being consistent with this can have a significant positive effect on your mood.    I would like  You to followup with Orlando Nicole for review of your use of CPAP.  I think you will have a better time using CPAP if you use it with a consistent and well timed sleep schedule.  Before you were attempting to apply the CPAP when you were not at all sleepy.  Given your health history and after discussing with our sleep team, it appears that CPAP over oral appliance would be the preferred route if possible.    We discussed how to wear CPAP during the day for an hour to desensitize you to your mask.    Followup with Orlando Nicole and me in a few weeks.          Your BMI is Body mass index is 32.92 kg/(m^2).  Weight management is a personal decision.  If you are interested in exploring weight loss strategies, the following discussion covers the approaches that may be successful. Body mass index (BMI) is one way to tell whether you are at a healthy weight, overweight, or obese. It measures your weight in relation to your  height.  A BMI of 18.5 to 24.9 is in the healthy range. A person with a BMI of 25 to 29.9 is considered overweight, and someone with a BMI of 30 or greater is considered obese. More than two-thirds of American adults are considered overweight or obese.  Being overweight or obese increases the risk for further weight gain. Excess weight may lead to heart disease and diabetes.  Creating and following plans for healthy eating and physical activity may help you improve your health.  Weight control is part of healthy lifestyle and includes exercise, emotional health, and healthy eating habits. Careful eating habits lifelong are the mainstay of weight control. Though there are significant health benefits from weight loss, long-term weight loss with diet alone may be very difficult to achieve- studies show long-term success with dietary management in less than 10% of people. Attaining a healthy weight may be especially difficult to achieve in those with severe obesity. In some cases, medications, devices and surgical management might be considered.  What can you do?  If you are overweight or obese and are interested in methods for weight loss, you should discuss this with your provider.     Consider reducing daily calorie intake by 500 calories.     Keep a food journal.     Avoiding skipping meals, consider cutting portions instead.    Diet combined with exercise helps maintain muscle while optimizing fat loss. Strength training is particularly important for building and maintaining muscle mass. Exercise helps reduce stress, increase energy, and improves fitness. Increasing exercise without diet control, however, may not burn enough calories to loose weight.       Start walking three days a week 10-20 minutes at a time    Work towards walking thirty minutes five days a week     Eventually, increase the speed of your walking for 1-2 minutes at time    In addition, we recommend that you review healthy lifestyles and methods  for weight loss available through the National Institutes of Health patient information sites:  http://win.niddk.nih.gov/publications/index.htm    And look into health and wellness programs that may be available through your health insurance provider, employer, local community center, or [employer].    Weight management plan: Patient was referred to their PCP to discuss a diet and exercise plan.

## 2017-07-22 ASSESSMENT — PATIENT HEALTH QUESTIONNAIRE - PHQ9: SUM OF ALL RESPONSES TO PHQ QUESTIONS 1-9: 24

## 2017-07-22 ASSESSMENT — ANXIETY QUESTIONNAIRES: GAD7 TOTAL SCORE: 21

## 2017-07-24 RX ORDER — ZOLPIDEM TARTRATE 10 MG/1
5-10 TABLET ORAL
Qty: 30 TABLET | Refills: 0 | Status: SHIPPED | OUTPATIENT
Start: 2017-07-24

## 2017-07-25 ENCOUNTER — DOCUMENTATION ONLY (OUTPATIENT)
Dept: SLEEP MEDICINE | Facility: CLINIC | Age: 62
End: 2017-07-25

## 2017-07-25 DIAGNOSIS — F51.04 PSYCHOPHYSIOLOGICAL INSOMNIA: ICD-10-CM

## 2017-07-25 DIAGNOSIS — G47.33 OSA (OBSTRUCTIVE SLEEP APNEA): ICD-10-CM

## 2017-07-25 DIAGNOSIS — G25.81 RESTLESS LEGS SYNDROME (RLS): ICD-10-CM

## 2017-07-25 NOTE — TELEPHONE ENCOUNTER
Rx for Ambien brought to [facility].

## 2017-07-25 NOTE — PROGRESS NOTES
30 DAY [facility] VISIT    Message left for patient to return call     Assessment: Pt not meeting objective benchmarks for  Compliance.   Action plan: Waiting for patient to return call.   Patient has a follow up visit with RICKIE Quinonez on 8/18/2017.   Device type: Auto-CPAP  PAP settings: CPAP min 6 cm  H20     CPAP max 12 cm  H20         95th% pressure 10.3 cm  H20   Objective measures: 14 day rolling measures      Compliance  0 %      Leak  15.36 lpm  last  upload      AHI 53.13   last  upload      Average number of minutes 21    Diagnostic AHI: 9.0         Objective measure goal  Compliance   Goal >70%  Leak   Goal < 10%  AHI  Goal < 5  Usage  Goal >240

## 2017-08-01 ENCOUNTER — CARE COORDINATION (OUTPATIENT)
Dept: CARE COORDINATION | Facility: CLINIC | Age: 62
End: 2017-08-01

## 2017-08-01 NOTE — PROGRESS NOTES
Clinic Care Coordination Contact  [facility]/Voicemail    Referral Source: PCP  Clinical Data: Care Coordinator Outreach  Outreach attempted x 1.  Left message on voicemail with call back information and requested return call.  Plan: Care Coordinator contacted [facility] for update on pt status. AARON Nolasco services representative did not have information to share regarding pt. Care Coordinator will try to reach patient again in 3-5 business days.    Zac Zarco [facility]  Care Coordination  [place]  [phone]  [email or web address]

## 2017-08-24 ENCOUNTER — OFFICE VISIT (OUTPATIENT)
Dept: SLEEP MEDICINE | Facility: CLINIC | Age: 62
End: 2017-08-24
Payer: COMMERCIAL

## 2017-08-24 VITALS
HEART RATE: 78 BPM | WEIGHT: 232 LBS | SYSTOLIC BLOOD PRESSURE: 107 MMHG | BODY MASS INDEX: 32.48 KG/M2 | DIASTOLIC BLOOD PRESSURE: 72 MMHG | OXYGEN SATURATION: 97 % | HEIGHT: 71 IN

## 2017-08-24 DIAGNOSIS — G47.33 OSA (OBSTRUCTIVE SLEEP APNEA): ICD-10-CM

## 2017-08-24 DIAGNOSIS — F51.04 PSYCHOPHYSIOLOGICAL INSOMNIA: ICD-10-CM

## 2017-08-24 DIAGNOSIS — G47.21 SLEEP DISORDER, CIRCADIAN, DELAYED SLEEP PHASE TYPE: Primary | ICD-10-CM

## 2017-08-24 DIAGNOSIS — G25.81 RESTLESS LEGS SYNDROME (RLS): ICD-10-CM

## 2017-08-24 PROCEDURE — 90834 PSYTX W PT 45 MINUTES: CPT | Performed by: PSYCHOLOGIST

## 2017-08-24 NOTE — PROGRESS NOTES
"SLEEP MEDICINE PROGRESS NOTE  Sleep Psychology    Patient Name: Russell Miller  MRN:  [id number]  Date of Service: Aug 24, 2017       Subjective Report     Russell Miller returns to the sleep clinic today to discuss progress in implementing cognitive-behavioral strategies for the management of insomnia associated with delayed sleep phase disorder, NEHA and psychophysiolgoic factors.  Russell reports that the new sleep schedule \"worked like magic\" and that he is falling asleep much better and demonstrating good sleep consolidation to about 10-11 am.  His main concern today was that his new face mask did not work well for him.  Indicates that of all masks the full face mask was best.  He was refitted today for a full face mask and given a single mask until he become eligible with his insurance to order in two months.  .  Daytime symptoms including sleepniess have improved significantly with new sleep schedule..    ESS 6 .     Sleep Data:     Averaged estimates since last office visit based on patient sleep diary data and self-report include:    Sleep Latency: 30 min.  Times Awakened: 1  Wake Time After Sleep Onset: 10 min.  Total Sleep Time:  420 min.  Sleep Efficiency: 90%     Interventions     Cognitive-behavioral strategies and recommendations including maintaining sleep schedule  were discussed today.  Education was provided regarding importance of treating NEHA.      Vital Signs     /72  Pulse 78  Ht 1.803 m (5' 11\")  Wt 105.2 kg (232 lb)  SpO2 97%  BMI 32.36 kg/m2     Mental Status     Appearance:  Well kept  Orientation:  X3  Mood:  better  Affect:  Congruent with mood  Speech/Language:  Normal  Thought Process: Intact  Associations:  Normal  Thought Content: Normal    Diagnostic Impressions and Plan   (G47.21) Sleep disorder, circadian, delayed sleep phase type  (primary encounter diagnosis)  Comment Sleeping much better with adjusted sleep schedule.  Plan: Continue with 3-11 sleep schedule    (G47.33) NEHA " (obstructive sleep apnea)  Comment: Fitted patient with new full face mask  Plan: Followup with sleep medicine in two weeks around mask fit and adherence    (F51.04) Psychophysiological insomnia  Comment: Resolved with adjustment of sleep schedule to fit with delayed sleep phase pattern  Plan: Continue with 3-11 am sleep schedule    (G25.81) Restless legs syndrome (RLS)    Follow-up: {2 weeks with JAROD Quinonez to check how he is doing with CPAP use with new full face mask    Time Spent: 40 minutes      Francisco Carvalho PsyD, LP, CBSM  Certified, Behavioral Sleep Medicine  [facility]

## 2017-08-24 NOTE — NURSING NOTE
"Chief Complaint   Patient presents with     RECHECK     f/u       Initial /72  Pulse 78  Ht 1.803 m (5' 11\")  Wt 105.2 kg (232 lb)  SpO2 97%  BMI 32.36 kg/m2 Estimated body mass index is 32.36 kg/(m^2) as calculated from the following:    Height as of this encounter: 1.803 m (5' 11\").    Weight as of this encounter: 105.2 kg (232 lb).  Medication Reconciliation: complete    "

## 2017-08-24 NOTE — PATIENT INSTRUCTIONS

## 2017-08-24 NOTE — MR AVS SNAPSHOT
After Visit Summary   8/24/2017    Russell Miller    MRN: [id number]           Patient Information     Date Of Birth          1955        Visit Information        Provider Department      8/24/2017 1:00 PM Francisco Carvalho PsyD [facility]        Today's Diagnoses     Sleep disorder, circadian, delayed sleep phase type    -  1    NEHA (obstructive sleep apnea)        Psychophysiological insomnia        Restless legs syndrome (RLS)          Care Instructions      Your BMI is Body mass index is 32.36 kg/(m^2).  Weight management is a personal decision.  If you are interested in exploring weight loss strategies, the following discussion covers the approaches that may be successful. Body mass index (BMI) is one way to tell whether you are at a healthy weight, overweight, or obese. It measures your weight in relation to your height.  A BMI of 18.5 to 24.9 is in the healthy range. A person with a BMI of 25 to 29.9 is considered overweight, and someone with a BMI of 30 or greater is considered obese. More than two-thirds of American adults are considered overweight or obese.  Being overweight or obese increases the risk for further weight gain. Excess weight may lead to heart disease and diabetes.  Creating and following plans for healthy eating and physical activity may help you improve your health.  Weight control is part of healthy lifestyle and includes exercise, emotional health, and healthy eating habits. Careful eating habits lifelong are the mainstay of weight control. Though there are significant health benefits from weight loss, long-term weight loss with diet alone may be very difficult to achieve- studies show long-term success with dietary management in less than 10% of people. Attaining a healthy weight may be especially difficult to achieve in those with severe obesity. In some cases, medications, devices and surgical management might be considered.  What can you  do?  If you are overweight or obese and are interested in methods for weight loss, you should discuss this with your provider.     Consider reducing daily calorie intake by 500 calories.     Keep a food journal.     Avoiding skipping meals, consider cutting portions instead.    Diet combined with exercise helps maintain muscle while optimizing fat loss. Strength training is particularly important for building and maintaining muscle mass. Exercise helps reduce stress, increase energy, and improves fitness. Increasing exercise without diet control, however, may not burn enough calories to loose weight.       Start walking three days a week 10-20 minutes at a time    Work towards walking thirty minutes five days a week     Eventually, increase the speed of your walking for 1-2 minutes at time    In addition, we recommend that you review healthy lifestyles and methods for weight loss available through the National Institutes of Health patient information sites:  http://win.niddk.nih.gov/publications/index.htm    And look into health and wellness programs that may be available through your health insurance provider, employer, local community center, or antonette club.    Weight management plan: Patient was referred to their PCP to discuss a diet and exercise plan.              Follow-ups after your visit        Your next 10 appointments already scheduled     Sep 14, 2017  1:00 PM CDT   Return Sleep Patient with RICKIE Patel   [facility] ([facility]    [address] [place] [address] [phone]              Who to contact     If you have questions or need follow up information about today's clinic visit or your schedule please contact [facility] directly at [phone].  Normal or non-critical lab and imaging results will be communicated to you by MyChart, letter or phone within 4 business days after the clinic has  "received the results. If you do not hear from us within 7 days, please contact the clinic through wunderloop or phone. If you have a critical or abnormal lab result, we will notify you by phone as soon as possible.  Submit refill requests through wunderloop or call your pharmacy and they will forward the refill request to us. Please allow 3 business days for your refill to be completed.          Additional Information About Your Visit        wunderloop Information     wunderloop lets you send messages to your doctor, view your test results, renew your prescriptions, schedule appointments and more. To sign up, go to [email or web address] . Click on \"Log in\" on the left side of the screen, which will take you to the Welcome page. Then click on \"Sign up Now\" on the right side of the page.     You will be asked to enter the access code listed below, as well as some personal information. Please follow the directions to create your username and password.     Your access code is: [email or web address]  Expires: 10/18/2017  8:07 AM     Your access code will  in 90 days. If you need help or a new code, please call your [facility] clinic or [phone].        Care EveryWhere ID     This is your Care EveryWhere ID. This could be used by other organizations to access your [facility] medical records  [id number]        Your Vitals Were     Pulse Height Pulse Oximetry BMI (Body Mass Index)          78 1.803 m (5' 11\") 97% 32.36 kg/m2         Blood Pressure from Last 3 Encounters:   17 107/72   17 111/80   17 113/80    Weight from Last 3 Encounters:   17 105.2 kg (232 lb)   17 107 kg (236 lb)   17 108.6 kg (239 lb 6.4 oz)              Today, you had the following     No orders found for display       Primary Care Provider Office Phone # Fax #    Sharad Maya PA-C [phone]       [address] [place] [address]        Equal Access to Services     LOVE BAXTER AH: Andrew blankenship " Kin, wasusanada luqadaha, qaybta kaalmada bibi, konstantin elomarge locke hayleelisa lairenecarrie eileen. So [facility] [phone].    ATENCIÓN: Si joanna oconnell, tiene a hartmann disposición servicios gratuitos de asistencia lingüística. Noah al [phone].    We comply with applicable federal civil rights laws and [place] laws. We do not discriminate on the basis of race, color, national origin, age, disability sex, sexual orientation or gender identity.            Thank you!     Thank you for choosing [facility]  for your care. Our goal is always to provide you with excellent care. Hearing back from our patients is one way we can continue to improve our services. Please take a few minutes to complete the written survey that you may receive in the mail after your visit with us. Thank you!             Your Updated Medication List - Protect others around you: Learn how to safely use, store and throw away your medicines at www.disposemymeds.org.          This list is accurate as of: 8/24/17  2:30 PM.  Always use your most recent med list.                   Brand Name Dispense Instructions for use Diagnosis    aspirin 81 MG tablet     30 tablet    Take 1 tablet (81 mg) by mouth daily        buPROPion 300 MG 24 hr tablet    WELLBUTRIN XL    90 tablet    Take 1 tablet (300 mg) by mouth every morning    Moderate single current episode of major depressive disorder (H)       carvedilol 12.5 MG tablet    COREG    180 tablet    Take 1 tablet (12.5 mg) by mouth 2 times daily (with meals)    Essential hypertension       cetirizine 10 MG tablet    zyrTEC    30 tablet    Take 1 tablet (10 mg) by mouth daily    Chronic seasonal allergic rhinitis, unspecified trigger       lisinopril-hydrochlorothiazide 10-12.5 MG per tablet    PRINZIDE/ZESTORETIC    90 tablet    Take 1 tablet by mouth daily    Essential hypertension       nitroGLYcerin 0.4 MG sublingual tablet    NITROSTAT    25 tablet    Place 1 tablet (0.4 mg)  under the tongue every 5 minutes as needed for chest pain If still having symptoms after 3 doses call 911.    Atypical chest pain       order for DME      Equipment ordered: RESMED Auto PAP Mask type: Full face  Settings: 6-12 cm        polyethylene glycol powder    MIRALAX    850 g    Take 17 g (1 capful) by mouth daily    Chronic idiopathic constipation       zolpidem 10 MG tablet    AMBIEN    30 tablet    Take 0.5-1 tablets (5-10 mg) by mouth nightly as needed for sleep    Psychophysiological insomnia

## 2017-09-12 ENCOUNTER — RADIANT APPOINTMENT (OUTPATIENT)
Dept: GENERAL RADIOLOGY | Facility: CLINIC | Age: 62
End: 2017-09-12
Attending: PHYSICIAN ASSISTANT
Payer: COMMERCIAL

## 2017-09-12 ENCOUNTER — RADIANT APPOINTMENT (OUTPATIENT)
Dept: CT IMAGING | Facility: CLINIC | Age: 62
End: 2017-09-12
Attending: PHYSICIAN ASSISTANT
Payer: COMMERCIAL

## 2017-09-12 ENCOUNTER — OFFICE VISIT (OUTPATIENT)
Dept: FAMILY MEDICINE | Facility: CLINIC | Age: 62
End: 2017-09-12
Payer: COMMERCIAL

## 2017-09-12 VITALS
DIASTOLIC BLOOD PRESSURE: 86 MMHG | HEIGHT: 71 IN | RESPIRATION RATE: 16 BRPM | BODY MASS INDEX: 32.2 KG/M2 | WEIGHT: 230 LBS | HEART RATE: 74 BPM | SYSTOLIC BLOOD PRESSURE: 121 MMHG | OXYGEN SATURATION: 96 % | TEMPERATURE: 98.1 F

## 2017-09-12 DIAGNOSIS — R10.84 ABDOMINAL PAIN, GENERALIZED: Primary | ICD-10-CM

## 2017-09-12 DIAGNOSIS — R10.84 ABDOMINAL PAIN, GENERALIZED: ICD-10-CM

## 2017-09-12 LAB
ERYTHROCYTE [DISTWIDTH] IN BLOOD BY AUTOMATED COUNT: 12.3 % (ref 10–15)
ERYTHROCYTE [SEDIMENTATION RATE] IN BLOOD BY WESTERGREN METHOD: 6 MM/H (ref 0–20)
HCT VFR BLD AUTO: 43.9 % (ref 40–53)
HGB BLD-MCNC: 15.7 G/DL (ref 13.3–17.7)
MCH RBC QN AUTO: 32.9 PG (ref 26.5–33)
MCHC RBC AUTO-ENTMCNC: 35.8 G/DL (ref 31.5–36.5)
MCV RBC AUTO: 92 FL (ref 78–100)
PLATELET # BLD AUTO: 200 10E9/L (ref 150–450)
RBC # BLD AUTO: 4.77 10E12/L (ref 4.4–5.9)
WBC # BLD AUTO: 8.3 10E9/L (ref 4–11)

## 2017-09-12 PROCEDURE — 74020 XR ABDOMEN 2 VW: CPT

## 2017-09-12 PROCEDURE — 99214 OFFICE O/P EST MOD 30 MIN: CPT | Performed by: PHYSICIAN ASSISTANT

## 2017-09-12 PROCEDURE — 36415 COLL VENOUS BLD VENIPUNCTURE: CPT | Performed by: PHYSICIAN ASSISTANT

## 2017-09-12 PROCEDURE — 74177 CT ABD & PELVIS W/CONTRAST: CPT | Mod: TC

## 2017-09-12 PROCEDURE — 83690 ASSAY OF LIPASE: CPT | Performed by: PHYSICIAN ASSISTANT

## 2017-09-12 PROCEDURE — 85652 RBC SED RATE AUTOMATED: CPT | Performed by: PHYSICIAN ASSISTANT

## 2017-09-12 PROCEDURE — 80053 COMPREHEN METABOLIC PANEL: CPT | Performed by: PHYSICIAN ASSISTANT

## 2017-09-12 PROCEDURE — 85027 COMPLETE CBC AUTOMATED: CPT | Performed by: PHYSICIAN ASSISTANT

## 2017-09-12 PROCEDURE — 82150 ASSAY OF AMYLASE: CPT | Performed by: PHYSICIAN ASSISTANT

## 2017-09-12 RX ORDER — IOPAMIDOL 755 MG/ML
100 INJECTION, SOLUTION INTRAVASCULAR ONCE
Status: COMPLETED | OUTPATIENT
Start: 2017-09-12 | End: 2017-09-12

## 2017-09-12 RX ADMIN — IOPAMIDOL 100 ML: 755 INJECTION, SOLUTION INTRAVASCULAR at 17:09

## 2017-09-12 NOTE — PROGRESS NOTES
SUBJECTIVE:   Russell Miller is a 62 year old male who presents to clinic today for the following health issues:      ABDOMINAL and FLANK   PAIN     Onset: 4 days    Description:   Character: Cramping  Location: right flank left flank  Radiation: None    Intensity: moderate    Progression of Symptoms:  worsening    Accompanying Signs & Symptoms:  Fever/Chills?: no   Gas/Bloating: YES  Nausea: YES  Vomitting: YES  Diarrhea?: YES  Constipation:no   Dysuria or Hematuria: no    History:   Trauma: no   Previous similar pain: no    Previous tests done: none    Precipitating factors:   Does the pain change with:     Food: YES --pain increases    BM: no     Urination: no     Alleviating factors:      Therapies Tried and outcome:     LMP:  not applicable       Abdominal pain and bloating. Had been constipated, took a laxitive on Saturday, loose stools since (generally 2 bm's per day). No fevers. No blood in stools. No black stools. No pyrosis. Some nausea and occasional vomiting. No cold symptoms. No irritative/obst voiding symptoms. Pain: 3-4/10.      Problem list and histories reviewed & adjusted, as indicated.  Additional history: as documented    BP Readings from Last 3 Encounters:   09/12/17 121/86   08/24/17 107/72   07/21/17 111/80    Wt Readings from Last 3 Encounters:   09/12/17 230 lb (104.3 kg)   08/24/17 232 lb (105.2 kg)   07/21/17 236 lb (107 kg)                      Reviewed and updated as needed this visit by clinical staffTobacco  Allergies  Meds       Reviewed and updated as needed this visit by Provider         All other systems negative except as outline above  OBJECTIVE:  Eye exam - right eye normal lid, conjunctiva, cornea, pupil and fundus, left eye normal lid, conjunctiva, cornea, pupil and fundus.  ENT exam reveals - ENT exam normal, no neck nodes or sinus tenderness.  Thyroid not palpable, not enlarged, no nodules detected.  CHEST:chest clear to IPPA, no tachypnea, retractions or cyanosis and S1, S2  normal, no murmur, no gallop, rate regular.  ABDOMEN: mild tenderness in the epigastric and lower abdomen area, without rebound, guarding, mass or organomegaly. Abdomen is soft and bowel sounds are normal.    Russell was seen today for abdominal pain.    Diagnoses and all orders for this visit:    Abdominal pain, generalized  -     CBC with platelets  -     Lipase  -     Amylase  -     Comprehensive metabolic panel  -     XR Abdomen 2 Views  -     Erythrocyte sedimentation rate auto  -     CT Abdomen Pelvis w Contrast; Future      Advised supportive and symptomatic treatment.  Follow up with Provider - if condition persists or worsens.

## 2017-09-12 NOTE — MR AVS SNAPSHOT
"              After Visit Summary   9/12/2017    Russell Miller    MRN: [id number]           Patient Information     Date Of Birth          1955        Visit Information        Provider Department      9/12/2017 1:40 PM Sharad Maya PA-C [facility]        Today's Diagnoses     Abdominal pain, generalized    -  1       Follow-ups after your visit        Your next 10 appointments already scheduled     Sep 12, 2017  1:40 PM CDT   SHORT with Sharad Maya PA-C   [facility] ([facility] [place])    [address] [facility]  [place] [address] [phone]            Sep 14, 2017  1:00 PM CDT   Return Sleep Patient with RICKIE Patel   [facility] ([facility])    [address] [place] [id number] [phone]              Who to contact     Normal or non-critical lab and imaging results will be communicated to you by [email or web address], letter or phone within 4 business days after the clinic has received the results. If you do not hear from us within 7 days, please contact the clinic through [email or web address] or phone. If you have a critical or abnormal lab result, we will notify you by phone as soon as possible.  Submit refill requests through [employer] or call your pharmacy and they will forward the refill request to us. Please allow 3 business days for your refill to be completed.          If you need to speak with a  for additional information , please call: [phone]             Additional Information About Your Visit        [employer] Information     [employer] lets you send messages to your doctor, view your test results, renew your prescriptions, schedule appointments and more. To sign up, go to [email or web address] . Click on \"Log in\" on the left side of the screen, which will take you to the Welcome page. Then click on \"Sign up Now\" on the right side of the page.     You will be asked to enter the " "access code listed below, as well as some personal information. Please follow the directions to create your username and password.     Your access code is: [email or web address]  Expires: 10/18/2017  8:07 AM     Your access code will  in 90 days. If you need help or a new code, please call your [facility] clinic or [phone].        Care EveryWhere ID     This is your Care EveryWhere ID. This could be used by other organizations to access your [facility] medical records  [id number]        Your Vitals Were     Pulse Temperature Respirations Height Pulse Oximetry BMI (Body Mass Index)    74 98.1  F (36.7  C) (Tympanic) 16 5' 11\" (1.803 m) 96% 32.08 kg/m2       Blood Pressure from Last 3 Encounters:   17 121/86   17 107/72   17 111/80    Weight from Last 3 Encounters:   17 230 lb (104.3 kg)   17 232 lb (105.2 kg)   17 236 lb (107 kg)              We Performed the Following     Amylase     CBC with platelets     Comprehensive metabolic panel     Erythrocyte sedimentation rate auto     Lipase     XR Abdomen 2 Views          Today's Medication Changes          These changes are accurate as of: 17  1:30 PM.  If you have any questions, ask your nurse or doctor.               Stop taking these medicines if you haven't already. Please contact your care team if you have questions.     cetirizine 10 MG tablet   Commonly known as:  zyrTEC   Stopped by:  Sharad Maya PA-C                    Primary Care Provider Office Phone # Fax #    Sharad Maya PA-C [phone] [address] [facility] [address] [facility] [address]        Equal Access to Services     [facility] VA Medical CenterJULIA : Andrew Sanderson, wasusanada luqadaha, qaybta kaalmada bibi, konstantin arellano. So [facility] [phone].    ATENCIÓN: Si habla español, tiene a hartmann disposición servicios gratuitos de asistencia lingüística. Llame al [phone].    We comply with applicable federal civil rights " laws and [place] laws. We do not discriminate on the basis of race, color, national origin, age, disability sex, sexual orientation or gender identity.            Thank you!     Thank you for choosing [facility]  for your care. Our goal is always to provide you with excellent care. Hearing back from our patients is one way we can continue to improve our services. Please take a few minutes to complete the written survey that you may receive in the mail after your visit with us. Thank you!             Your Updated Medication List - Protect others around you: Learn how to safely use, store and throw away your medicines at www.disposemymeds.org.          This list is accurate as of: 9/12/17  1:30 PM.  Always use your most recent med list.                   Brand Name Dispense Instructions for use Diagnosis    aspirin 81 MG tablet     30 tablet    Take 1 tablet (81 mg) by mouth daily        buPROPion 300 MG 24 hr tablet    WELLBUTRIN XL    90 tablet    Take 1 tablet (300 mg) by mouth every morning    Moderate single current episode of major depressive disorder (H)       carvedilol 12.5 MG tablet    COREG    180 tablet    Take 1 tablet (12.5 mg) by mouth 2 times daily (with meals)    Essential hypertension       lisinopril-hydrochlorothiazide 10-12.5 MG per tablet    PRINZIDE/ZESTORETIC    90 tablet    Take 1 tablet by mouth daily    Essential hypertension       nitroGLYcerin 0.4 MG sublingual tablet    NITROSTAT    25 tablet    Place 1 tablet (0.4 mg) under the tongue every 5 minutes as needed for chest pain If still having symptoms after 3 doses call 911.    Atypical chest pain       order for DME      Equipment ordered: RESMED Auto PAP Mask type: Full face  Settings: 6-12 cm        polyethylene glycol powder    MIRALAX    850 g    Take 17 g (1 capful) by mouth daily    Chronic idiopathic constipation       zolpidem 10 MG tablet    AMBIEN    30 tablet    Take 0.5-1 tablets (5-10 mg) by mouth nightly as  needed for sleep    Psychophysiological insomnia

## 2017-09-13 ENCOUNTER — NURSE TRIAGE (OUTPATIENT)
Dept: NURSING | Facility: CLINIC | Age: 62
End: 2017-09-13

## 2017-09-13 LAB
ALBUMIN SERPL-MCNC: 3.8 G/DL (ref 3.4–5)
ALP SERPL-CCNC: 60 U/L (ref 40–150)
ALT SERPL W P-5'-P-CCNC: 26 U/L (ref 0–70)
AMYLASE SERPL-CCNC: 75 U/L (ref 30–110)
ANION GAP SERPL CALCULATED.3IONS-SCNC: 7 MMOL/L (ref 3–14)
AST SERPL W P-5'-P-CCNC: 21 U/L (ref 0–45)
BILIRUB SERPL-MCNC: 0.5 MG/DL (ref 0.2–1.3)
BUN SERPL-MCNC: 12 MG/DL (ref 7–30)
CALCIUM SERPL-MCNC: 9.1 MG/DL (ref 8.5–10.1)
CHLORIDE SERPL-SCNC: 102 MMOL/L (ref 94–109)
CO2 SERPL-SCNC: 28 MMOL/L (ref 20–32)
CREAT SERPL-MCNC: 0.85 MG/DL (ref 0.66–1.25)
GFR SERPL CREATININE-BSD FRML MDRD: >90 ML/MIN/1.7M2
GLUCOSE SERPL-MCNC: 96 MG/DL (ref 70–99)
LIPASE SERPL-CCNC: 249 U/L (ref 73–393)
POTASSIUM SERPL-SCNC: 4 MMOL/L (ref 3.4–5.3)
PROT SERPL-MCNC: 7.5 G/DL (ref 6.8–8.8)
SODIUM SERPL-SCNC: 137 MMOL/L (ref 133–144)

## 2017-09-13 NOTE — TELEPHONE ENCOUNTER
"Caller states abdominal pain that he was seen for yesterday in clinic has returned and rates it 10/10; no vomiting. Took a \"vicodin\" from wife's RX.  Triage protocol reviewed.  Advised to proceed to ED but have someone else drive as he has taken vicodin    Reason for Disposition    [1] SEVERE pain (e.g., excruciating) AND [2] present > 1 hour    [1] SEVERE pain AND [2] age > 60    Protocols used: ABDOMINAL PAIN - MALE-ADULT-  Kiki Solis RN  [facility]    "

## 2017-09-14 ENCOUNTER — TELEPHONE (OUTPATIENT)
Dept: FAMILY MEDICINE | Facility: CLINIC | Age: 62
End: 2017-09-14

## 2017-09-14 NOTE — TELEPHONE ENCOUNTER
Spoke with pt and gave him the information from Sharad below. He verbalized understanding and appreciation for the call.

## 2017-09-14 NOTE — TELEPHONE ENCOUNTER
Patient was seen in [facility] yesterday for abd. Pain. No reason is being found for his pain. All he has been given is Vicodin for pain. Does Sharad want to see him again about this. Marion would like some answers. Please call and discuss CT scan results as well.

## 2017-09-14 NOTE — TELEPHONE ENCOUNTER
Let jose know that no obvious findings were noted that would be the cause of his abdominal discomfort. A small 1cm probable lymph node was noted. Due to its mild enlargement, i'd like it reevaluated with another abdominal ct scan in 6 mos. Regarding his symptoms, I suggest some type of colitis, food intolerance or IBS. i'd recommend he take beano daily to help with his excess gas. Also, try a gluten free diet for a couple of weeks and eat slowly during meal time. I don't want him on consistent narcotics, especially since he reported to me that his pain symptoms were a 2-4 on a 0 to 10 scale. Lets see how things play out over the next week and then have patient check in with me.

## 2017-09-15 ENCOUNTER — OFFICE VISIT (OUTPATIENT)
Dept: FAMILY MEDICINE | Facility: CLINIC | Age: 62
End: 2017-09-15
Payer: COMMERCIAL

## 2017-09-15 VITALS
BODY MASS INDEX: 32.62 KG/M2 | SYSTOLIC BLOOD PRESSURE: 116 MMHG | DIASTOLIC BLOOD PRESSURE: 76 MMHG | WEIGHT: 233 LBS | OXYGEN SATURATION: 97 % | TEMPERATURE: 98.4 F | RESPIRATION RATE: 18 BRPM | HEART RATE: 76 BPM | HEIGHT: 71 IN

## 2017-09-15 DIAGNOSIS — R19.7 DIARRHEA, UNSPECIFIED TYPE: Primary | ICD-10-CM

## 2017-09-15 DIAGNOSIS — R10.13 ABDOMINAL PAIN, EPIGASTRIC: ICD-10-CM

## 2017-09-15 LAB
ALBUMIN UR-MCNC: NEGATIVE MG/DL
APPEARANCE UR: CLEAR
BILIRUB UR QL STRIP: NEGATIVE
COLOR UR AUTO: YELLOW
ERYTHROCYTE [DISTWIDTH] IN BLOOD BY AUTOMATED COUNT: 12.6 % (ref 10–15)
GLUCOSE UR STRIP-MCNC: NEGATIVE MG/DL
HCT VFR BLD AUTO: 44.3 % (ref 40–53)
HGB BLD-MCNC: 15.5 G/DL (ref 13.3–17.7)
HGB UR QL STRIP: NEGATIVE
KETONES UR STRIP-MCNC: ABNORMAL MG/DL
LEUKOCYTE ESTERASE UR QL STRIP: NEGATIVE
MCH RBC QN AUTO: 32.9 PG (ref 26.5–33)
MCHC RBC AUTO-ENTMCNC: 35 G/DL (ref 31.5–36.5)
MCV RBC AUTO: 94 FL (ref 78–100)
NITRATE UR QL: NEGATIVE
PH UR STRIP: 6 PH (ref 5–7)
PLATELET # BLD AUTO: 211 10E9/L (ref 150–450)
RBC # BLD AUTO: 4.71 10E12/L (ref 4.4–5.9)
SOURCE: ABNORMAL
SP GR UR STRIP: 1.01 (ref 1–1.03)
UROBILINOGEN UR STRIP-ACNC: 0.2 EU/DL (ref 0.2–1)
WBC # BLD AUTO: 7.1 10E9/L (ref 4–11)

## 2017-09-15 PROCEDURE — 36415 COLL VENOUS BLD VENIPUNCTURE: CPT | Performed by: PHYSICIAN ASSISTANT

## 2017-09-15 PROCEDURE — 81003 URINALYSIS AUTO W/O SCOPE: CPT | Performed by: PHYSICIAN ASSISTANT

## 2017-09-15 PROCEDURE — 85027 COMPLETE CBC AUTOMATED: CPT | Performed by: PHYSICIAN ASSISTANT

## 2017-09-15 PROCEDURE — 99214 OFFICE O/P EST MOD 30 MIN: CPT | Performed by: PHYSICIAN ASSISTANT

## 2017-09-15 RX ORDER — SUCRALFATE ORAL 1 G/10ML
1 SUSPENSION ORAL 4 TIMES DAILY
Qty: 560 ML | Refills: 0 | Status: SHIPPED | OUTPATIENT
Start: 2017-09-15 | End: 2017-09-29

## 2017-09-15 RX ORDER — HYDROCODONE BITARTRATE AND ACETAMINOPHEN 5; 325 MG/1; MG/1
1-2 TABLET ORAL EVERY 4 HOURS PRN
Qty: 25 TABLET | Refills: 0 | Status: SHIPPED | OUTPATIENT
Start: 2017-09-15 | End: 2018-01-02

## 2017-09-15 NOTE — MR AVS SNAPSHOT
After Visit Summary   9/15/2017    Russell Miller    MRN: [id number]           Patient Information     Date Of Birth          1955        Visit Information        Provider Department      9/15/2017 10:00 AM Sharad Maya PA-C [facility] [place]        Today's Diagnoses     Diarrhea, unspecified type    -  1    Abdominal pain, epigastric           Follow-ups after your visit        Your next 10 appointments already scheduled     Sep 25, 2017  1:00 PM CDT   DME RETURN with SLIM CHANDLER   [facility] ([facility])    [address] [place] [id number]   [phone]              Future tests that were ordered for you today     Open Future Orders        Priority Expected Expires Ordered    US Abdomen Limited Routine  9/15/2018 9/15/2017    H Pylori antigen stool Routine  10/15/2017 9/15/2017    Clostridium difficile Toxin B PCR Routine  10/15/2017 9/15/2017    Enteric Bacteria and Virus Panel by AMAURI Stool Routine  9/15/2018 9/15/2017            Who to contact     Normal or non-critical lab and imaging results will be communicated to you by Cuculushart, letter or phone within 4 business days after the clinic has received the results. If you do not hear from us within 7 days, please contact the clinic through Cuculushart or phone. If you have a critical or abnormal lab result, we will notify you by phone as soon as possible.  Submit refill requests through [employer] Towers or call your pharmacy and they will forward the refill request to us. Please allow 3 business days for your refill to be completed.          If you need to speak with a  for additional information , please call: [phone]             Additional Information About Your Visit        CuculusharHome Online Income Systems Information     Russian Towers lets you send messages to your doctor, view your test results, renew your prescriptions, schedule appointments and more. To sign up, go to  [email or web address] . Click on \"Log in\" on the left side of the screen, which will take you to the Welcome page. Then click on \"Sign up Now\" on the right side of the page.     You will be asked to enter the access code listed below, as well as some personal information. Please follow the directions to create your username and password.     Your access code is: [email or web address]  Expires: 10/18/2017  8:07 AM     Your access code will  in 90 days. If you need help or a new code, please call your [place] clinic or [phone].        Care EveryWhere ID     This is your Care EveryWhere ID. This could be used by other organizations to access your [place] medical records  [id number]        Your Vitals Were     Pulse Temperature Respirations Height Pulse Oximetry BMI (Body Mass Index)    76 98.4  F (36.9  C) (Tympanic) 18 5' 11\" (1.803 m) 97% 32.5 kg/m2       Blood Pressure from Last 3 Encounters:   09/15/17 116/76   17 121/86   17 107/72    Weight from Last 3 Encounters:   09/15/17 233 lb (105.7 kg)   17 230 lb (104.3 kg)   17 232 lb (105.2 kg)              We Performed the Following     *UA reflex to Microscopic and Culture ([place] [facility] (except [place] and [place])     CBC with platelets          Today's Medication Changes          These changes are accurate as of: 9/15/17 11:26 AM.  If you have any questions, ask your nurse or doctor.               Start taking these medicines.        Dose/Directions    HYDROcodone-acetaminophen 5-325 MG per tablet   Commonly known as:  NORCO   Used for:  Diarrhea, unspecified type   Started by:  Sharad Maya PA-C        Dose:  1-2 tablet   Take 1-2 tablets by mouth every 4 hours as needed for moderate to severe pain   Quantity:  25 tablet   Refills:  0       omeprazole 20 MG CR capsule   Commonly known as:  priLOSEC   Used for:  Abdominal pain, epigastric   Started by:  Sharad Maya PA-C        Dose:  20 mg   Take 1 " capsule (20 mg) by mouth daily   Quantity:  90 capsule   Refills:  3       ranitidine 300 MG tablet   Commonly known as:  ZANTAC   Used for:  Abdominal pain, epigastric   Started by:  Sharad Maya PA-C        Dose:  300 mg   Take 1 tablet (300 mg) by mouth At Bedtime   Quantity:  30 tablet   Refills:  1       sucralfate 1 GM/10ML suspension   Commonly known as:  CARAFATE   Used for:  Abdominal pain, epigastric   Started by:  Sharad Maya PA-C        Dose:  1 g   Take 10 mLs (1 g) by mouth 4 times daily for 14 days   Quantity:  560 mL   Refills:  0            Where to get your medicines      These medications were sent to [facility] - [id number] [facility] [place] [place] [address]     Phone:  [phone]     omeprazole 20 MG CR capsule    ranitidine 300 MG tablet    sucralfate 1 GM/10ML suspension         Some of these will need a paper prescription and others can be bought over the counter.  Ask your nurse if you have questions.     Bring a paper prescription for each of these medications     HYDROcodone-acetaminophen 5-325 MG per tablet                Primary Care Provider Office Phone # Fax #    Sharad Maya PA-C [phone]       [address]  [place] [address]        Equal Access to Services     [facility]: Hadii aad ku hadasho Soomaali, waaxda luqadaha, qaybta kaalmada adeegyada, waxay idiin hayjessin cornelia garibay . So [facility] [phone].    ATENCIÓN: Si habla español, tiene a hartmann disposición servicios gratuitos de asistencia lingüística. Llame al [phone].    We comply with applicable federal civil rights laws and [place] laws. We do not discriminate on the basis of race, color, national origin, age, disability sex, sexual orientation or gender identity.            Thank you!     Thank you for choosing [facility]  for your care. Our goal is always to provide you with excellent care. Hearing back from  our patients is one way we can continue to improve our services. Please take a few minutes to complete the written survey that you may receive in the mail after your visit with us. Thank you!             Your Updated Medication List - Protect others around you: Learn how to safely use, store and throw away your medicines at www.disposemymeds.org.          This list is accurate as of: 9/15/17 11:26 AM.  Always use your most recent med list.                   Brand Name Dispense Instructions for use Diagnosis    aspirin 81 MG tablet     30 tablet    Take 1 tablet (81 mg) by mouth daily        buPROPion 300 MG 24 hr tablet    WELLBUTRIN XL    90 tablet    Take 1 tablet (300 mg) by mouth every morning    Moderate single current episode of major depressive disorder (H)       carvedilol 12.5 MG tablet    COREG    180 tablet    Take 1 tablet (12.5 mg) by mouth 2 times daily (with meals)    Essential hypertension       HYDROcodone-acetaminophen 5-325 MG per tablet    NORCO    25 tablet    Take 1-2 tablets by mouth every 4 hours as needed for moderate to severe pain    Diarrhea, unspecified type       lisinopril-hydrochlorothiazide 10-12.5 MG per tablet    PRINZIDE/ZESTORETIC    90 tablet    Take 1 tablet by mouth daily    Essential hypertension       nitroGLYcerin 0.4 MG sublingual tablet    NITROSTAT    25 tablet    Place 1 tablet (0.4 mg) under the tongue every 5 minutes as needed for chest pain If still having symptoms after 3 doses call 911.    Atypical chest pain       omeprazole 20 MG CR capsule    priLOSEC    90 capsule    Take 1 capsule (20 mg) by mouth daily    Abdominal pain, epigastric       order for DME      Equipment ordered: RESMED Auto PAP Mask type: Full face  Settings: 6-12 cm        polyethylene glycol powder    MIRALAX    850 g    Take 17 g (1 capful) by mouth daily    Chronic idiopathic constipation       ranitidine 300 MG tablet    ZANTAC    30 tablet    Take 1 tablet (300 mg) by mouth At Bedtime     Abdominal pain, epigastric       sucralfate 1 GM/10ML suspension    CARAFATE    560 mL    Take 10 mLs (1 g) by mouth 4 times daily for 14 days    Abdominal pain, epigastric       zolpidem 10 MG tablet    AMBIEN    30 tablet    Take 0.5-1 tablets (5-10 mg) by mouth nightly as needed for sleep    Psychophysiological insomnia

## 2017-09-15 NOTE — PROGRESS NOTES
SUBJECTIVE:   Russell Miller is a 62 year old male who presents to clinic today for the following health issues:      Abdominal Pain-pain rating 12/10.  Noting increased constipation has gone 1 time in the past 24 hours. Hard time urinating as well. States constant pain. Has been continually taking pain medication last time was 5 this morning  Worse after eating. Upper abdominal /epigastric pain. Occasional lower abd pain. Some diarrhea. Difficulty urinating. Some nausea. Food and fluids make it worse.  No blood in stools. Lighter brown color. No recent antibiotics. No fevers.       Problem list and histories reviewed & adjusted, as indicated.  Additional history: as documented        Reviewed and updated as needed this visit by clinical staff  Tobacco  Allergies  Meds       Reviewed and updated as needed this visit by Provider         All other systems negative except as outline above  OBJECTIVE:  Eye exam - right eye normal lid, conjunctiva, cornea, pupil and fundus, left eye normal lid, conjunctiva, cornea, pupil and fundus.  Thyroid not palpable, not enlarged, no nodules detected.  CHEST:chest clear to IPPA, no tachypnea, retractions or cyanosis and S1, S2 normal, no murmur, no gallop, rate regular.  ABDOMEN: mild tenderness in the epigastric and RUQ area, without rebound, guarding, mass or organomegaly. Abdomen is soft and bowel sounds are normal.    Russell was seen today for abdominal pain.    Diagnoses and all orders for this visit:    Diarrhea, unspecified type  -     Clostridium difficile Toxin B PCR; Future  -     HYDROcodone-acetaminophen (NORCO) 5-325 MG per tablet; Take 1-2 tablets by mouth every 4 hours as needed for moderate to severe pain  -     Enteric Bacteria and Virus Panel by AMAURI Stool; Future    Abdominal pain, epigastric  -     CBC with platelets  -     H Pylori antigen stool; Future  -     sucralfate (CARAFATE) 1 GM/10ML suspension; Take 10 mLs (1 g) by mouth 4 times daily for 14 days  -      omeprazole (PRILOSEC) 20 MG CR capsule; Take 1 capsule (20 mg) by mouth daily  -     ranitidine (ZANTAC) 300 MG tablet; Take 1 tablet (300 mg) by mouth At Bedtime  -     US Abdomen Limited; Future  -     *UA reflex to Microscopic and Culture ([place] [facility] (except [place] and [place])      Advised supportive and symptomatic treatment.  Follow up with Provider - if condition persists or worsens.

## 2017-09-16 ENCOUNTER — RADIANT APPOINTMENT (OUTPATIENT)
Dept: ULTRASOUND IMAGING | Facility: CLINIC | Age: 62
End: 2017-09-16
Attending: PHYSICIAN ASSISTANT
Payer: COMMERCIAL

## 2017-09-16 DIAGNOSIS — R10.13 ABDOMINAL PAIN, EPIGASTRIC: ICD-10-CM

## 2017-09-16 PROCEDURE — 76705 ECHO EXAM OF ABDOMEN: CPT

## 2017-09-18 ENCOUNTER — TELEPHONE (OUTPATIENT)
Dept: FAMILY MEDICINE | Facility: CLINIC | Age: 62
End: 2017-09-18

## 2017-12-26 DIAGNOSIS — I10 ESSENTIAL HYPERTENSION: ICD-10-CM

## 2017-12-26 RX ORDER — LISINOPRIL/HYDROCHLOROTHIAZIDE 10-12.5 MG
1 TABLET ORAL DAILY
Qty: 90 TABLET | Refills: 1 | Status: SHIPPED | OUTPATIENT
Start: 2017-12-26

## 2018-01-02 ENCOUNTER — OFFICE VISIT (OUTPATIENT)
Dept: FAMILY MEDICINE | Facility: CLINIC | Age: 63
End: 2018-01-02
Payer: COMMERCIAL

## 2018-01-02 VITALS
HEIGHT: 71 IN | DIASTOLIC BLOOD PRESSURE: 80 MMHG | HEART RATE: 73 BPM | RESPIRATION RATE: 16 BRPM | BODY MASS INDEX: 31.5 KG/M2 | SYSTOLIC BLOOD PRESSURE: 121 MMHG | OXYGEN SATURATION: 95 % | TEMPERATURE: 97.8 F | WEIGHT: 225 LBS

## 2018-01-02 DIAGNOSIS — Z23 IMMUNIZATION DUE: ICD-10-CM

## 2018-01-02 DIAGNOSIS — F41.1 GAD (GENERALIZED ANXIETY DISORDER): Chronic | ICD-10-CM

## 2018-01-02 DIAGNOSIS — Z13.6 CARDIOVASCULAR SCREENING; LDL GOAL LESS THAN 160: ICD-10-CM

## 2018-01-02 DIAGNOSIS — F32.1 MODERATE SINGLE CURRENT EPISODE OF MAJOR DEPRESSIVE DISORDER (H): Chronic | ICD-10-CM

## 2018-01-02 DIAGNOSIS — I10 ESSENTIAL HYPERTENSION: Primary | ICD-10-CM

## 2018-01-02 LAB
CHOLEST SERPL-MCNC: 155 MG/DL
HDLC SERPL-MCNC: 41 MG/DL
LDLC SERPL CALC-MCNC: 84 MG/DL
NONHDLC SERPL-MCNC: 114 MG/DL
TRIGL SERPL-MCNC: 148 MG/DL

## 2018-01-02 PROCEDURE — 80061 LIPID PANEL: CPT | Performed by: PHYSICIAN ASSISTANT

## 2018-01-02 PROCEDURE — 90736 HZV VACCINE LIVE SUBQ: CPT | Performed by: PHYSICIAN ASSISTANT

## 2018-01-02 PROCEDURE — 99214 OFFICE O/P EST MOD 30 MIN: CPT | Performed by: PHYSICIAN ASSISTANT

## 2018-01-02 PROCEDURE — 36415 COLL VENOUS BLD VENIPUNCTURE: CPT | Performed by: PHYSICIAN ASSISTANT

## 2018-01-02 RX ORDER — CARVEDILOL 12.5 MG/1
12.5 TABLET ORAL 2 TIMES DAILY WITH MEALS
Qty: 180 TABLET | Refills: 1 | Status: SHIPPED | OUTPATIENT
Start: 2018-01-02

## 2018-01-02 NOTE — LETTER
January 15, 2018      Russell Miller  [address]  [place] [address]        Dear Russell,    We are writing to inform you of your test results.    Your recent cholesterol numbers came back looking excellent.   Continue working on a lower fat, higher fiber diet and consistent exercise.       Resulted Orders   Lipid panel reflex to direct LDL Fasting   Result Value Ref Range    Cholesterol 155 <200 mg/dL    Triglycerides 148 <150 mg/dL      Comment:      Fasting specimen    HDL Cholesterol 41 >39 mg/dL    LDL Cholesterol Calculated 84 <100 mg/dL      Comment:      Desirable:       <100 mg/dl    Non HDL Cholesterol 114 <130 mg/dL       If you have any questions or concerns, please call the clinic at the number listed above.       Sincerely,    Sharad Maya PA-C/harmeet

## 2018-01-02 NOTE — LETTER
My Depression Action Plan  Name: Russell Miller   Date of Birth 1955  Date: 1/2/2018    My doctor: Sharad Maya   My clinic: [address]  [place] [id number] [phone]          GREEN    ZONE   Good Control    What it looks like:     Things are going generally well. You have normal up s and down s. You may even feel depressed from time to time, but bad moods usually last less than a day.   What you need to do:  1. Continue to care for yourself (see self care plan)  2. Check your depression survival kit and update it as needed  3. Follow your physician s recommendations including any medication.  4. Do not stop taking medication unless you consult with your physician first.           YELLOW         ZONE Getting Worse    What it looks like:     Depression is starting to interfere with your life.     It may be hard to get out of bed; you may be starting to isolate yourself from others.    Symptoms of depression are starting to last most all day and this has happened for several days.     You may have suicidal thoughts but they are not constant.   What you need to do:     1. Call your care team, your response to treatment will improve if you keep your care team informed of your progress. Yellow periods are signs an adjustment may need to be made.     2. Continue your self-care, even if you have to fake it!    3. Talk to someone in your support network    4. Open up your depression survival kit           RED    ZONE Medical Alert - Get Help    What it looks like:     Depression is seriously interfering with your life.     You may experience these or other symptoms: You can t get out of bed most days, can t work or engage in other necessary activities, you have trouble taking care of basic hygiene, or basic responsibilities, thoughts of suicide or death that will not go away, self-injurious behavior.     What you need to do:  1. Call your care team and request a  same-day appointment. If they are not available (weekends or after hours) call your local crisis line, emergency room or 911.      Electronically signed by: Paradise Davies, January 2, 2018    Depression Self Care Plan / Survival Kit    Self-Care for Depression  Here s the deal. Your body and mind are really not as separate as most people think.  What you do and think affects how you feel and how you feel influences what you do and think. This means if you do things that people who feel good do, it will help you feel better.  Sometimes this is all it takes.  There is also a place for medication and therapy depending on how severe your depression is, so be sure to consult with your medical provider and/ or Behavioral Health Consultant if your symptoms are worsening or not improving.     In order to better manage my stress, I will:    Exercise  Get some form of exercise, every day. This will help reduce pain and release endorphins, the  feel good  chemicals in your brain. This is almost as good as taking antidepressants!  This is not the same as joining a gym and then never going! (they count on that by the way ) It can be as simple as just going for a walk or doing some gardening, anything that will get you moving.      Hygiene   Maintain good hygiene (Get out of bed in the morning, Make your bed, Brush your teeth, Take a shower, and Get dressed like you were going to work, even if you are unemployed).  If your clothes don't fit try to get ones that do.    Diet  I will strive to eat foods that are good for me, drink plenty of water, and avoid excessive sugar, caffeine, alcohol, and other mood-altering substances.  Some foods that are helpful in depression are: complex carbohydrates, B vitamins, flaxseed, fish or fish oil, fresh fruits and vegetables.    Psychotherapy  I agree to participate in Individual Therapy (if recommended).    Medication  If prescribed medications, I agree to take them.  Missing doses can result  in serious side effects.  I understand that drinking alcohol, or other illicit drug use, may cause potential side effects.  I will not stop my medication abruptly without first discussing it with my provider.    Staying Connected With Others  I will stay in touch with my friends, family members, and my primary care provider/team.    Use your imagination  Be creative.  We all have a creative side; it doesn t matter if it s oil painting, sand castles, or mud pies! This will also kick up the endorphins.    Witness Beauty  (AKA stop and smell the roses) Take a look outside, even in mid-winter. Notice colors, textures. Watch the squirrels and birds.     Service to others  Be of service to others.  There is always someone else in need.  By helping others we can  get out of ourselves  and remember the really important things.  This also provides opportunities for practicing all the other parts of the program.    Humor  Laugh and be silly!  Adjust your TV habits for less news and crime-drama and more comedy.    Control your stress  Try breathing deep, massage therapy, biofeedback, and meditation. Find time to relax each day.     My support system    Clinic Contact:  Phone number:    Contact 1:  Phone number:    Contact 2:  Phone number:    Sabianist/:  Phone number:    Therapist:  Phone number:    Local crisis center:    Phone number:    Other community support:  Phone number:

## 2018-01-02 NOTE — MR AVS SNAPSHOT
"              After Visit Summary   2018    Russell Miller    MRN: [id number]           Patient Information     Date Of Birth          1955        Visit Information        Provider Department      2018 7:40 AM Sharad Maya PA-C [facility] [place]        Today's Diagnoses     Essential hypertension    -  1    Immunization due        CARDIOVASCULAR SCREENING; LDL GOAL LESS THAN 160        MARGOT (generalized anxiety disorder)        Moderate single current episode of major depressive disorder (H)           Follow-ups after your visit        Who to contact     Normal or non-critical lab and imaging results will be communicated to you by WeStudy.Inhart, letter or phone within 4 business days after the clinic has received the results. If you do not hear from us within 7 days, please contact the clinic through WeStudy.Inhart or phone. If you have a critical or abnormal lab result, we will notify you by phone as soon as possible.  Submit refill requests through Ultromex or call your pharmacy and they will forward the refill request to us. Please allow 3 business days for your refill to be completed.          If you need to speak with a  for additional information , please call: [phone]             Additional Information About Your Visit        MyChart Information     Ultromex lets you send messages to your doctor, view your test results, renew your prescriptions, schedule appointments and more. To sign up, go to [email or web address] . Click on \"Log in\" on the left side of the screen, which will take you to the Welcome page. Then click on \"Sign up Now\" on the right side of the page.     You will be asked to enter the access code listed below, as well as some personal information. Please follow the directions to create your username and password.     Your access code is: [email or web address]  Expires: 2018  8:16 AM     Your access code will  in 90 days. If you need help or a new code, please call " "your [place] clinic or [phone].        Care EveryWhere ID     This is your Care EveryWhere ID. This could be used by other organizations to access your [place] medical records  [id number]        Your Vitals Were     Pulse Temperature Respirations Height Pulse Oximetry BMI (Body Mass Index)    73 97.8  F (36.6  C) (Tympanic) 16 5' 11\" (1.803 m) 95% 31.38 kg/m2       Blood Pressure from Last 3 Encounters:   01/02/18 121/80   09/15/17 116/76   09/12/17 121/86    Weight from Last 3 Encounters:   01/02/18 225 lb (102.1 kg)   09/15/17 233 lb (105.7 kg)   09/12/17 230 lb (104.3 kg)              We Performed the Following     DEPRESSION ACTION PLAN (DAP)     Lipid panel reflex to direct LDL Fasting     ZOSTER VACC LIVE SUBQ NJX          Today's Medication Changes          These changes are accurate as of: 1/2/18  8:16 AM.  If you have any questions, ask your nurse or doctor.               These medicines have changed or have updated prescriptions.        Dose/Directions    omeprazole 20 MG CR capsule   Commonly known as:  priLOSEC   This may have changed:  Another medication with the same name was removed. Continue taking this medication, and follow the directions you see here.   Changed by:  Sharad Maya PA-C        Dose:  20 mg   Take 20 mg by mouth daily   Refills:  0         Stop taking these medicines if you haven't already. Please contact your care team if you have questions.     buPROPion 300 MG 24 hr tablet   Commonly known as:  WELLBUTRIN XL   Stopped by:  Sharad Maya PA-C           HYDROcodone-acetaminophen 5-325 MG per tablet   Commonly known as:  NORCO   Stopped by:  Sharad Maya PA-C           polyethylene glycol powder   Commonly known as:  MIRALAX   Stopped by:  Sharad Maay PA-C           ranitidine 300 MG tablet   Commonly known as:  ZANTAC   Stopped by:  Sharad Maya PA-C                Where to get your medicines      These medications were sent to [facility] [place] [address] [facility] [place] [place]  [address] [facility] [place] [address]     Phone:  [phone]     carvedilol 12.5 MG tablet                Primary Care Provider Office Phone # Fax #    Sharad Caroline Maya PA-C [phone]       [address]  [place] [address]        Equal Access to Services     [facility]: Hadii aad ku hadasho Soomaali, waaxda luqadaha, qaybta kaalmada adeegyada, waxay idiin hayaan adeeg kharash la'aan . So [facility] [phone].    ATENCIÓN: Si habla español, tiene a hartmann disposición servicios gratuitos de asistencia lingüística. Llame al [phone].    We comply with applicable federal civil rights laws and [place] laws. We do not discriminate on the basis of race, color, national origin, age, disability, sex, sexual orientation, or gender identity.            Thank you!     Thank you for choosing [facility]  for your care. Our goal is always to provide you with excellent care. Hearing back from our patients is one way we can continue to improve our services. Please take a few minutes to complete the written survey that you may receive in the mail after your visit with us. Thank you!             Your Updated Medication List - Protect others around you: Learn how to safely use, store and throw away your medicines at www.disposemymeds.org.          This list is accurate as of: 1/2/18  8:16 AM.  Always use your most recent med list.                   Brand Name Dispense Instructions for use Diagnosis    aspirin 81 MG tablet     30 tablet    Take 1 tablet (81 mg) by mouth daily        carvedilol 12.5 MG tablet    COREG    180 tablet    Take 1 tablet (12.5 mg) by mouth 2 times daily (with meals)    Essential hypertension       lisinopril-hydrochlorothiazide 10-12.5 MG per tablet    PRINZIDE/ZESTORETIC    90 tablet    Take 1 tablet by mouth daily    Essential hypertension       nitroGLYcerin 0.4 MG sublingual tablet    NITROSTAT    25 tablet    Place 1  tablet (0.4 mg) under the tongue every 5 minutes as needed for chest pain If still having symptoms after 3 doses call 911.    Atypical chest pain       omeprazole 20 MG CR capsule    priLOSEC     Take 20 mg by mouth daily        order for DME      Equipment ordered: RESMED Auto PAP Mask type: Full face  Settings: 6-12 cm        zolpidem 10 MG tablet    AMBIEN    30 tablet    Take 0.5-1 tablets (5-10 mg) by mouth nightly as needed for sleep    Psychophysiological insomnia

## 2018-01-02 NOTE — PROGRESS NOTES
SUBJECTIVE:   Russell Miller is a 63 year old male who presents to clinic today for the following health issues:      Hypertension Follow-up      Outpatient blood pressures are not being checked.      Low Salt Diet: not monitoring salt  Blood pressure running normal.   Depression and Anxiety Follow-Up    Status since last visit: Improved     Other associated symptoms:None    Complicating factors:     Significant life event: No     Current substance abuse: Cannabis  Doing well. ptsd and anxiety better. Went out to [place] and purchased medical marijuana and reports that it's helped a great deal in limiting his anxiety and improving his quality of life. He's sleeping well. Stopped see psych. Thought the doc's were just pushing pills.   PHQ-9 Score and MyChart F/U Questions 5/26/2017 6/28/2017 7/21/2017   Total Score 11 14 24   Q9: Suicide Ideation Not at all Several days Nearly every day     MARGOT-7 SCORE 5/26/2017 6/28/2017 7/21/2017   Total Score 11 16 21     In the past two weeks have you had thoughts of suicide or self-harm?  No.    Do you have concerns about your personal safety or the safety of others?   No    PHQ-9  English  PHQ-9   Any Language  GAD7  Suicide Assessment Five-step Evaluation and Treatment (SAFE-T)        Amount of exercise or physical activity: None    Problems taking medications regularly: No    Medication side effects: none  Diet: regular (no restrictions)          Problem list and histories reviewed & adjusted, as indicated.  Additional history: as documented    BP Readings from Last 3 Encounters:   01/02/18 121/80   09/15/17 116/76   09/12/17 121/86    Wt Readings from Last 3 Encounters:   01/02/18 225 lb (102.1 kg)   09/15/17 233 lb (105.7 kg)   09/12/17 230 lb (104.3 kg)                      Reviewed and updated as needed this visit by clinical staffTobacco  Allergies  Meds  Problems  Med Hx  Surg Hx  Fam Hx  Soc Hx        Reviewed and updated as needed this visit by Provider  Tobacco   Allergies  Meds  Problems  Med Hx  Surg Hx  Fam Hx  Soc Hx          All other systems negative except as outline above  OBJECTIVE:  Eye exam - right eye normal lid, conjunctiva, cornea, pupil and fundus, left eye normal lid, conjunctiva, cornea, pupil and fundus.  Thyroid not palpable, not enlarged, no nodules detected.  CHEST:chest clear to IPPA, no tachypnea, retractions or cyanosis and S1, S2 normal, no murmur, no gallop, rate regular.  Foot exam - both sides normal; no swelling, tenderness or skin or vascular lesions. Color and temperature is normal. Sensation is intact. Peripheral pulses are palpable. Toenails are normal.    Russell was seen today for depression, anxiety and hypertension.    Diagnoses and all orders for this visit:    Essential hypertension  -     carvedilol (COREG) 12.5 MG tablet; Take 1 tablet (12.5 mg) by mouth 2 times daily (with meals)    Immunization due  -     ZOSTER VACC LIVE SUBQ NJX    CARDIOVASCULAR SCREENING; LDL GOAL LESS THAN 160  -     Lipid panel reflex to direct LDL Fasting    MARGOT (generalized anxiety disorder)    Moderate single current episode of major depressive disorder (H)    Other orders  -     DEPRESSION ACTION PLAN (DAP)  -     Cancel: Basic metabolic panel  (Ca, Cl, CO2, Creat, Gluc, K, Na, BUN)      work on lifestyle modification  Recheck in 6 mos.

## 2018-01-04 ENCOUNTER — TELEPHONE (OUTPATIENT)
Dept: SLEEP MEDICINE | Facility: CLINIC | Age: 63
End: 2018-01-04

## 2018-01-04 ASSESSMENT — ANXIETY QUESTIONNAIRES
1. FEELING NERVOUS, ANXIOUS, OR ON EDGE: NEARLY EVERY DAY
3. WORRYING TOO MUCH ABOUT DIFFERENT THINGS: SEVERAL DAYS
IF YOU CHECKED OFF ANY PROBLEMS ON THIS QUESTIONNAIRE, HOW DIFFICULT HAVE THESE PROBLEMS MADE IT FOR YOU TO DO YOUR WORK, TAKE CARE OF THINGS AT HOME, OR GET ALONG WITH OTHER PEOPLE: VERY DIFFICULT
2. NOT BEING ABLE TO STOP OR CONTROL WORRYING: SEVERAL DAYS
GAD7 TOTAL SCORE: 10
6. BECOMING EASILY ANNOYED OR IRRITABLE: SEVERAL DAYS
5. BEING SO RESTLESS THAT IT IS HARD TO SIT STILL: MORE THAN HALF THE DAYS
7. FEELING AFRAID AS IF SOMETHING AWFUL MIGHT HAPPEN: NOT AT ALL

## 2018-01-04 ASSESSMENT — PATIENT HEALTH QUESTIONNAIRE - PHQ9
5. POOR APPETITE OR OVEREATING: MORE THAN HALF THE DAYS
SUM OF ALL RESPONSES TO PHQ QUESTIONS 1-9: 0

## 2018-01-04 NOTE — TELEPHONE ENCOUNTER
"Patient called and wants to leave a message for the sleep psychologist. I asked if there was anything I could help him with and I could schedule him and appointment. He stated \"I have already been through the steps of your clinic and I want to speak with the sleep psychologist to see if he can help me with alternative therapies to help me sleep!\" I expressed that I can make him an appointment for him and he wants to talk to  first. Please advise with patient [phone]  "

## 2018-01-05 ASSESSMENT — ANXIETY QUESTIONNAIRES: GAD7 TOTAL SCORE: 10

## 2019-01-10 ENCOUNTER — HOME CARE/HOSPICE - HEALTHEAST (OUTPATIENT)
Dept: HOME HEALTH SERVICES | Facility: HOME HEALTH | Age: 64
End: 2019-01-10

## 2019-11-28 NOTE — PROGRESS NOTES
3 DAY STM VISIT    Patient contacted for 3 day STM visit  Message left for patient to return call    Current settings:  EPAP Min Auto CPAP: 6.0 (The minimum allowable pressure in cmH2O)       EPAP Max Auto CPAP: 12.0 (The maximum allowable pressure in cmH2O)       Assessment: Nightly usage, most nights over four hours   Action plan: Pt to have f/u 14 day STM visit.   Problem: Potential for Falls  Goal: Patient will remain free of falls  Description  INTERVENTIONS:  - Assess patient frequently for physical needs  -  Identify cognitive and physical deficits and behaviors that affect risk of falls    -  [place] fall precautions as indicated by assessment   - Educate patient/family on patient safety including physical limitations  - Instruct patient to call for assistance with activity based on assessment  - Modify environment to reduce risk of injury  - Consider OT/PT consult to assist with strengthening/mobility  Outcome: Progressing     Problem: PAIN - ADULT  Goal: Verbalizes/displays adequate comfort level or baseline comfort level  Description  Interventions:  - Encourage patient to monitor pain and request assistance  - Assess pain using appropriate pain scale  - Administer analgesics based on type and severity of pain and evaluate response  - Implement non-pharmacological measures as appropriate and evaluate response  - Consider cultural and social influences on pain and pain management  - Notify physician/advanced practitioner if interventions unsuccessful or patient reports new pain  Outcome: Progressing     Problem: INFECTION - ADULT  Goal: Absence or prevention of progression during hospitalization  Description  INTERVENTIONS:  - Assess and monitor for signs and symptoms of infection  - Monitor lab/diagnostic results  - Monitor all insertion sites, i e  indwelling lines, tubes, and drains  - Monitor endotracheal if appropriate and nasal secretions for changes in amount and color  - [place] appropriate cooling/warming therapies per order  - Administer medications as ordered  - Instruct and encourage patient and family to use good hand hygiene technique  - Identify and instruct in appropriate isolation precautions for identified infection/condition  Outcome: Progressing     Problem: GENITOURINARY - ADULT  Goal: Maintains or returns to baseline urinary function  Description  INTERVENTIONS:  - Assess urinary function  - Encourage oral fluids to ensure adequate hydration if ordered  - Administer IV fluids as ordered to ensure adequate hydration  - Administer ordered medications as needed  - Offer frequent toileting  - Follow urinary retention protocol if ordered  Outcome: Progressing  Goal: Absence of urinary retention  Description  INTERVENTIONS:  - Assess patients ability to void and empty bladder  - Monitor I/O  - Bladder scan as needed  - Discuss with physician/AP medications to alleviate retention as needed  - Discuss catheterization for long term situations as appropriate  Outcome: Progressing

## 2020-08-04 NOTE — PROGRESS NOTES
LMTCB   Patient returned call.     Subjective measures: Pt states things are going ok so far.  He has no issues or complaints and is benefiting from therapy.  Patient meeting subjective benchmarks.     Action plan:Pt to have 14 day [facility] visit.

## 2022-02-17 PROBLEM — Z71.89 ADVANCED DIRECTIVES, COUNSELING/DISCUSSION: Chronic | Status: ACTIVE | Noted: 2017-01-03

## 2024-06-17 PROBLEM — Z71.89 ADVANCED DIRECTIVES, COUNSELING/DISCUSSION: Status: RESOLVED | Noted: 2017-01-03 | Resolved: 2024-06-17
